# Patient Record
Sex: MALE | Race: WHITE | NOT HISPANIC OR LATINO | Employment: OTHER | URBAN - METROPOLITAN AREA
[De-identification: names, ages, dates, MRNs, and addresses within clinical notes are randomized per-mention and may not be internally consistent; named-entity substitution may affect disease eponyms.]

---

## 2019-02-03 PROBLEM — J18.9 PNEUMONIA DUE TO INFECTIOUS ORGANISM: Status: ACTIVE | Noted: 2019-02-03

## 2019-02-03 PROBLEM — J10.1 INFLUENZA A: Status: ACTIVE | Noted: 2019-02-03

## 2019-02-03 PROBLEM — I42.0 DILATED CARDIOMYOPATHY (HCC): Status: ACTIVE | Noted: 2019-02-03

## 2019-02-03 PROBLEM — I48.91 ATRIAL FIBRILLATION WITH RVR (HCC): Status: ACTIVE | Noted: 2019-02-03

## 2019-11-07 ENCOUNTER — APPOINTMENT (OUTPATIENT)
Dept: RADIOLOGY | Facility: MEDICAL CENTER | Age: 74
DRG: 389 | End: 2019-11-07
Attending: EMERGENCY MEDICINE
Payer: MEDICARE

## 2019-11-07 ENCOUNTER — HOSPITAL ENCOUNTER (INPATIENT)
Facility: MEDICAL CENTER | Age: 74
LOS: 2 days | DRG: 389 | End: 2019-11-09
Attending: EMERGENCY MEDICINE | Admitting: HOSPITALIST
Payer: MEDICARE

## 2019-11-07 ENCOUNTER — HOSPITAL ENCOUNTER (OUTPATIENT)
Dept: RADIOLOGY | Facility: MEDICAL CENTER | Age: 74
End: 2019-11-07

## 2019-11-07 PROBLEM — I48.91 ATRIAL FIBRILLATION (HCC): Status: ACTIVE | Noted: 2019-11-07

## 2019-11-07 PROBLEM — Q79.0 MORGAGNI HERNIA: Status: ACTIVE | Noted: 2019-11-07

## 2019-11-07 PROBLEM — R79.9 ELEVATED BUN: Status: ACTIVE | Noted: 2019-11-07

## 2019-11-07 PROBLEM — K56.609 SMALL BOWEL OBSTRUCTION (HCC): Status: ACTIVE | Noted: 2019-11-07

## 2019-11-07 PROBLEM — D69.6 THROMBOCYTOPENIA (HCC): Status: ACTIVE | Noted: 2019-11-07

## 2019-11-07 PROBLEM — E87.1 HYPONATREMIA: Status: ACTIVE | Noted: 2019-11-07

## 2019-11-07 PROBLEM — D64.9 ANEMIA: Status: ACTIVE | Noted: 2019-11-07

## 2019-11-07 LAB
ALBUMIN SERPL BCP-MCNC: 4.2 G/DL (ref 3.2–4.9)
ALBUMIN/GLOB SERPL: 1.4 G/DL
ALP SERPL-CCNC: 129 U/L (ref 30–99)
ALT SERPL-CCNC: 18 U/L (ref 2–50)
ANION GAP SERPL CALC-SCNC: 9 MMOL/L (ref 0–11.9)
APTT PPP: 26.1 SEC (ref 24.7–36)
AST SERPL-CCNC: 22 U/L (ref 12–45)
BASOPHILS # BLD AUTO: 0.2 % (ref 0–1.8)
BASOPHILS # BLD: 0.02 K/UL (ref 0–0.12)
BILIRUB SERPL-MCNC: 1.3 MG/DL (ref 0.1–1.5)
BUN SERPL-MCNC: 39 MG/DL (ref 8–22)
CALCIUM SERPL-MCNC: 9.7 MG/DL (ref 8.5–10.5)
CHLORIDE SERPL-SCNC: 98 MMOL/L (ref 96–112)
CO2 SERPL-SCNC: 27 MMOL/L (ref 20–33)
CREAT SERPL-MCNC: 1.17 MG/DL (ref 0.5–1.4)
EOSINOPHIL # BLD AUTO: 0.01 K/UL (ref 0–0.51)
EOSINOPHIL NFR BLD: 0.1 % (ref 0–6.9)
ERYTHROCYTE [DISTWIDTH] IN BLOOD BY AUTOMATED COUNT: 49 FL (ref 35.9–50)
GLOBULIN SER CALC-MCNC: 3 G/DL (ref 1.9–3.5)
GLUCOSE SERPL-MCNC: 120 MG/DL (ref 65–99)
HCT VFR BLD AUTO: 40.5 % (ref 42–52)
HGB BLD-MCNC: 13.4 G/DL (ref 14–18)
IMM GRANULOCYTES # BLD AUTO: 0.02 K/UL (ref 0–0.11)
IMM GRANULOCYTES NFR BLD AUTO: 0.2 % (ref 0–0.9)
INR PPP: 1.09 (ref 0.87–1.13)
LACTATE BLD-SCNC: 1.6 MMOL/L (ref 0.5–2)
LYMPHOCYTES # BLD AUTO: 0.41 K/UL (ref 1–4.8)
LYMPHOCYTES NFR BLD: 4.6 % (ref 22–41)
MCH RBC QN AUTO: 31.9 PG (ref 27–33)
MCHC RBC AUTO-ENTMCNC: 33.1 G/DL (ref 33.7–35.3)
MCV RBC AUTO: 96.4 FL (ref 81.4–97.8)
MONOCYTES # BLD AUTO: 0.4 K/UL (ref 0–0.85)
MONOCYTES NFR BLD AUTO: 4.5 % (ref 0–13.4)
NEUTROPHILS # BLD AUTO: 8.05 K/UL (ref 1.82–7.42)
NEUTROPHILS NFR BLD: 90.4 % (ref 44–72)
NRBC # BLD AUTO: 0 K/UL
NRBC BLD-RTO: 0 /100 WBC
PLATELET # BLD AUTO: 157 K/UL (ref 164–446)
PMV BLD AUTO: 10.1 FL (ref 9–12.9)
POTASSIUM SERPL-SCNC: 4.2 MMOL/L (ref 3.6–5.5)
PROT SERPL-MCNC: 7.2 G/DL (ref 6–8.2)
PROTHROMBIN TIME: 14.4 SEC (ref 12–14.6)
RBC # BLD AUTO: 4.2 M/UL (ref 4.7–6.1)
SODIUM SERPL-SCNC: 134 MMOL/L (ref 135–145)
WBC # BLD AUTO: 8.9 K/UL (ref 4.8–10.8)

## 2019-11-07 PROCEDURE — 85730 THROMBOPLASTIN TIME PARTIAL: CPT

## 2019-11-07 PROCEDURE — 85025 COMPLETE CBC W/AUTO DIFF WBC: CPT | Mod: 91

## 2019-11-07 PROCEDURE — 99285 EMERGENCY DEPT VISIT HI MDM: CPT

## 2019-11-07 PROCEDURE — 80053 COMPREHEN METABOLIC PANEL: CPT | Mod: 91

## 2019-11-07 PROCEDURE — 96374 THER/PROPH/DIAG INJ IV PUSH: CPT

## 2019-11-07 PROCEDURE — 700105 HCHG RX REV CODE 258: Performed by: EMERGENCY MEDICINE

## 2019-11-07 PROCEDURE — 770006 HCHG ROOM/CARE - MED/SURG/GYN SEMI*

## 2019-11-07 PROCEDURE — 99223 1ST HOSP IP/OBS HIGH 75: CPT | Performed by: HOSPITALIST

## 2019-11-07 PROCEDURE — 700111 HCHG RX REV CODE 636 W/ 250 OVERRIDE (IP): Performed by: EMERGENCY MEDICINE

## 2019-11-07 PROCEDURE — 85610 PROTHROMBIN TIME: CPT

## 2019-11-07 PROCEDURE — 83605 ASSAY OF LACTIC ACID: CPT | Mod: 91

## 2019-11-07 RX ORDER — HYDRALAZINE HYDROCHLORIDE 20 MG/ML
10 INJECTION INTRAMUSCULAR; INTRAVENOUS EVERY 6 HOURS PRN
Status: DISCONTINUED | OUTPATIENT
Start: 2019-11-07 | End: 2019-11-09 | Stop reason: HOSPADM

## 2019-11-07 RX ORDER — SPIRONOLACTONE 50 MG/1
25 TABLET, FILM COATED ORAL 2 TIMES DAILY
Status: DISCONTINUED | OUTPATIENT
Start: 2019-11-07 | End: 2019-11-09 | Stop reason: HOSPADM

## 2019-11-07 RX ORDER — ONDANSETRON 4 MG/1
4 TABLET, ORALLY DISINTEGRATING ORAL EVERY 4 HOURS PRN
Status: DISCONTINUED | OUTPATIENT
Start: 2019-11-07 | End: 2019-11-09 | Stop reason: HOSPADM

## 2019-11-07 RX ORDER — SPIRONOLACTONE 25 MG/1
25 TABLET ORAL 2 TIMES DAILY
Status: ON HOLD | COMMUNITY
End: 2022-04-12 | Stop reason: SDUPTHER

## 2019-11-07 RX ORDER — OXYCODONE HYDROCHLORIDE 10 MG/1
10 TABLET ORAL
Status: DISCONTINUED | OUTPATIENT
Start: 2019-11-07 | End: 2019-11-09 | Stop reason: HOSPADM

## 2019-11-07 RX ORDER — FUROSEMIDE 20 MG/1
20 TABLET ORAL DAILY
Status: ON HOLD | COMMUNITY
End: 2021-11-16

## 2019-11-07 RX ORDER — ONDANSETRON 2 MG/ML
4 INJECTION INTRAMUSCULAR; INTRAVENOUS EVERY 4 HOURS PRN
Status: DISCONTINUED | OUTPATIENT
Start: 2019-11-07 | End: 2019-11-09 | Stop reason: HOSPADM

## 2019-11-07 RX ORDER — POLYETHYLENE GLYCOL 3350 17 G/17G
1 POWDER, FOR SOLUTION ORAL
Status: DISCONTINUED | OUTPATIENT
Start: 2019-11-07 | End: 2019-11-09 | Stop reason: HOSPADM

## 2019-11-07 RX ORDER — CARVEDILOL 6.25 MG/1
3.12 TABLET ORAL DAILY
Status: DISCONTINUED | OUTPATIENT
Start: 2019-11-08 | End: 2019-11-09 | Stop reason: HOSPADM

## 2019-11-07 RX ORDER — SODIUM CHLORIDE 9 MG/ML
INJECTION, SOLUTION INTRAVENOUS CONTINUOUS
Status: DISCONTINUED | OUTPATIENT
Start: 2019-11-07 | End: 2019-11-09 | Stop reason: HOSPADM

## 2019-11-07 RX ORDER — GABAPENTIN 300 MG/1
600 CAPSULE ORAL 3 TIMES DAILY
Status: DISCONTINUED | OUTPATIENT
Start: 2019-11-07 | End: 2019-11-09 | Stop reason: HOSPADM

## 2019-11-07 RX ORDER — PANTOPRAZOLE SODIUM 40 MG/1
40 TABLET, DELAYED RELEASE ORAL 2 TIMES DAILY
Status: DISCONTINUED | OUTPATIENT
Start: 2019-11-07 | End: 2019-11-07

## 2019-11-07 RX ORDER — HYDROMORPHONE HYDROCHLORIDE 1 MG/ML
0.5 INJECTION, SOLUTION INTRAMUSCULAR; INTRAVENOUS; SUBCUTANEOUS
Status: DISCONTINUED | OUTPATIENT
Start: 2019-11-07 | End: 2019-11-09 | Stop reason: HOSPADM

## 2019-11-07 RX ORDER — AMOXICILLIN 250 MG
2 CAPSULE ORAL 2 TIMES DAILY
Status: DISCONTINUED | OUTPATIENT
Start: 2019-11-07 | End: 2019-11-09 | Stop reason: HOSPADM

## 2019-11-07 RX ORDER — BISACODYL 10 MG
10 SUPPOSITORY, RECTAL RECTAL
Status: DISCONTINUED | OUTPATIENT
Start: 2019-11-07 | End: 2019-11-09 | Stop reason: HOSPADM

## 2019-11-07 RX ORDER — PANTOPRAZOLE SODIUM 40 MG/1
40 TABLET, DELAYED RELEASE ORAL 2 TIMES DAILY
COMMUNITY

## 2019-11-07 RX ORDER — OXYCODONE HYDROCHLORIDE 5 MG/1
5 TABLET ORAL
Status: DISCONTINUED | OUTPATIENT
Start: 2019-11-07 | End: 2019-11-09 | Stop reason: HOSPADM

## 2019-11-07 RX ORDER — SODIUM CHLORIDE, SODIUM LACTATE, POTASSIUM CHLORIDE, CALCIUM CHLORIDE 600; 310; 30; 20 MG/100ML; MG/100ML; MG/100ML; MG/100ML
1000 INJECTION, SOLUTION INTRAVENOUS ONCE
Status: COMPLETED | OUTPATIENT
Start: 2019-11-07 | End: 2019-11-07

## 2019-11-07 RX ORDER — DOCUSATE SODIUM 100 MG/1
100 CAPSULE, LIQUID FILLED ORAL 3 TIMES DAILY
Status: ON HOLD | COMMUNITY
End: 2023-03-19

## 2019-11-07 RX ORDER — CARVEDILOL 3.12 MG/1
3.12 TABLET ORAL 2 TIMES DAILY
COMMUNITY

## 2019-11-07 RX ORDER — GABAPENTIN 300 MG/1
600 CAPSULE ORAL 3 TIMES DAILY
COMMUNITY

## 2019-11-07 RX ORDER — LOSARTAN POTASSIUM 50 MG/1
25 TABLET ORAL 2 TIMES DAILY
Status: DISCONTINUED | OUTPATIENT
Start: 2019-11-07 | End: 2019-11-09 | Stop reason: HOSPADM

## 2019-11-07 RX ORDER — OMEPRAZOLE 20 MG/1
20 CAPSULE, DELAYED RELEASE ORAL 2 TIMES DAILY
Status: DISCONTINUED | OUTPATIENT
Start: 2019-11-07 | End: 2019-11-09 | Stop reason: HOSPADM

## 2019-11-07 RX ORDER — LABETALOL HYDROCHLORIDE 5 MG/ML
10 INJECTION, SOLUTION INTRAVENOUS EVERY 6 HOURS PRN
Status: DISCONTINUED | OUTPATIENT
Start: 2019-11-07 | End: 2019-11-09 | Stop reason: HOSPADM

## 2019-11-07 RX ORDER — DIGOXIN 250 MCG
125 TABLET ORAL DAILY
Status: DISCONTINUED | OUTPATIENT
Start: 2019-11-07 | End: 2019-11-08

## 2019-11-07 RX ORDER — POLYETHYLENE GLYCOL 3350 17 G/17G
17 POWDER, FOR SOLUTION ORAL
COMMUNITY
End: 2022-07-25

## 2019-11-07 RX ORDER — HYDROMORPHONE HYDROCHLORIDE 1 MG/ML
1 INJECTION, SOLUTION INTRAMUSCULAR; INTRAVENOUS; SUBCUTANEOUS ONCE
Status: COMPLETED | OUTPATIENT
Start: 2019-11-07 | End: 2019-11-07

## 2019-11-07 RX ORDER — PRAVASTATIN SODIUM 40 MG
40 TABLET ORAL NIGHTLY
COMMUNITY

## 2019-11-07 RX ORDER — LOSARTAN POTASSIUM 25 MG/1
25 TABLET ORAL 2 TIMES DAILY
Status: ON HOLD | COMMUNITY
End: 2021-11-16

## 2019-11-07 RX ORDER — PRAVASTATIN SODIUM 20 MG
40 TABLET ORAL NIGHTLY
Status: DISCONTINUED | OUTPATIENT
Start: 2019-11-07 | End: 2019-11-09 | Stop reason: HOSPADM

## 2019-11-07 RX ORDER — FUROSEMIDE 20 MG/1
20 TABLET ORAL DAILY
Status: DISCONTINUED | OUTPATIENT
Start: 2019-11-08 | End: 2019-11-09 | Stop reason: HOSPADM

## 2019-11-07 RX ADMIN — SODIUM CHLORIDE, POTASSIUM CHLORIDE, SODIUM LACTATE AND CALCIUM CHLORIDE 1000 ML: 600; 310; 30; 20 INJECTION, SOLUTION INTRAVENOUS at 18:27

## 2019-11-07 RX ADMIN — HYDROMORPHONE HYDROCHLORIDE 1 MG: 1 INJECTION, SOLUTION INTRAMUSCULAR; INTRAVENOUS; SUBCUTANEOUS at 18:44

## 2019-11-07 ASSESSMENT — LIFESTYLE VARIABLES
AVERAGE NUMBER OF DAYS PER WEEK YOU HAVE A DRINK CONTAINING ALCOHOL: 7
HOW MANY TIMES IN THE PAST YEAR HAVE YOU HAD 5 OR MORE DRINKS IN A DAY: 0
CONSUMPTION TOTAL: NEGATIVE
HAVE PEOPLE ANNOYED YOU BY CRITICIZING YOUR DRINKING: NO
SUBSTANCE_ABUSE: 0
HAVE YOU EVER FELT YOU SHOULD CUT DOWN ON YOUR DRINKING: NO
ON A TYPICAL DAY WHEN YOU DRINK ALCOHOL HOW MANY DRINKS DO YOU HAVE: 2
EVER_SMOKED: NEVER
TOTAL SCORE: 0
EVER FELT BAD OR GUILTY ABOUT YOUR DRINKING: NO
TOTAL SCORE: 0
EVER HAD A DRINK FIRST THING IN THE MORNING TO STEADY YOUR NERVES TO GET RID OF A HANGOVER: NO
DOES PATIENT WANT TO STOP DRINKING: NO
TOTAL SCORE: 0
ALCOHOL_USE: YES

## 2019-11-07 ASSESSMENT — ENCOUNTER SYMPTOMS
ABDOMINAL PAIN: 1
PALPITATIONS: 0
BRUISES/BLEEDS EASILY: 0
FLANK PAIN: 0
EYE DISCHARGE: 0
FEVER: 0
CHILLS: 0
WEAKNESS: 0
FOCAL WEAKNESS: 0
HEARTBURN: 0
DOUBLE VISION: 0
COUGH: 0
HEMOPTYSIS: 0
DEPRESSION: 0
SPEECH CHANGE: 0
DIZZINESS: 0
BLURRED VISION: 0
SENSORY CHANGE: 0
VOMITING: 0
NAUSEA: 1
HALLUCINATIONS: 0
MYALGIAS: 0
SHORTNESS OF BREATH: 0

## 2019-11-07 ASSESSMENT — COPD QUESTIONNAIRES
HAVE YOU SMOKED AT LEAST 100 CIGARETTES IN YOUR ENTIRE LIFE: NO/DON'T KNOW
IN THE PAST 12 MONTHS DO YOU DO LESS THAN YOU USED TO BECAUSE OF YOUR BREATHING PROBLEMS: DISAGREE/UNSURE
DURING THE PAST 4 WEEKS HOW MUCH DID YOU FEEL SHORT OF BREATH: NONE/LITTLE OF THE TIME
COPD SCREENING SCORE: 2
DO YOU EVER COUGH UP ANY MUCUS OR PHLEGM?: NO/ONLY WITH OCCASIONAL COLDS OR INFECTIONS

## 2019-11-07 ASSESSMENT — COGNITIVE AND FUNCTIONAL STATUS - GENERAL
SUGGESTED CMS G CODE MODIFIER MOBILITY: CH
SUGGESTED CMS G CODE MODIFIER MOBILITY: CH
SUGGESTED CMS G CODE MODIFIER DAILY ACTIVITY: CH
MOBILITY SCORE: 24
MOBILITY SCORE: 24
DAILY ACTIVITIY SCORE: 24

## 2019-11-07 ASSESSMENT — PATIENT HEALTH QUESTIONNAIRE - PHQ9
SUM OF ALL RESPONSES TO PHQ9 QUESTIONS 1 AND 2: 0
1. LITTLE INTEREST OR PLEASURE IN DOING THINGS: NOT AT ALL
2. FEELING DOWN, DEPRESSED, IRRITABLE, OR HOPELESS: NOT AT ALL

## 2019-11-08 LAB
ALBUMIN SERPL BCP-MCNC: 3.8 G/DL (ref 3.2–4.9)
ALBUMIN/GLOB SERPL: 1.6 G/DL
ALP SERPL-CCNC: 108 U/L (ref 30–99)
ALT SERPL-CCNC: 16 U/L (ref 2–50)
ANION GAP SERPL CALC-SCNC: 6 MMOL/L (ref 0–11.9)
AST SERPL-CCNC: 18 U/L (ref 12–45)
BASOPHILS # BLD AUTO: 0.2 % (ref 0–1.8)
BASOPHILS # BLD: 0.01 K/UL (ref 0–0.12)
BILIRUB SERPL-MCNC: 1.2 MG/DL (ref 0.1–1.5)
BUN SERPL-MCNC: 33 MG/DL (ref 8–22)
CALCIUM SERPL-MCNC: 9.6 MG/DL (ref 8.5–10.5)
CHLORIDE SERPL-SCNC: 100 MMOL/L (ref 96–112)
CO2 SERPL-SCNC: 30 MMOL/L (ref 20–33)
CREAT SERPL-MCNC: 1.07 MG/DL (ref 0.5–1.4)
EOSINOPHIL # BLD AUTO: 0.04 K/UL (ref 0–0.51)
EOSINOPHIL NFR BLD: 0.8 % (ref 0–6.9)
ERYTHROCYTE [DISTWIDTH] IN BLOOD BY AUTOMATED COUNT: 49.6 FL (ref 35.9–50)
GLOBULIN SER CALC-MCNC: 2.4 G/DL (ref 1.9–3.5)
GLUCOSE SERPL-MCNC: 93 MG/DL (ref 65–99)
HCT VFR BLD AUTO: 37.5 % (ref 42–52)
HGB BLD-MCNC: 12 G/DL (ref 14–18)
IMM GRANULOCYTES # BLD AUTO: 0.01 K/UL (ref 0–0.11)
IMM GRANULOCYTES NFR BLD AUTO: 0.2 % (ref 0–0.9)
LYMPHOCYTES # BLD AUTO: 0.67 K/UL (ref 1–4.8)
LYMPHOCYTES NFR BLD: 12.7 % (ref 22–41)
MCH RBC QN AUTO: 30.9 PG (ref 27–33)
MCHC RBC AUTO-ENTMCNC: 32 G/DL (ref 33.7–35.3)
MCV RBC AUTO: 96.6 FL (ref 81.4–97.8)
MONOCYTES # BLD AUTO: 0.46 K/UL (ref 0–0.85)
MONOCYTES NFR BLD AUTO: 8.7 % (ref 0–13.4)
NEUTROPHILS # BLD AUTO: 4.1 K/UL (ref 1.82–7.42)
NEUTROPHILS NFR BLD: 77.4 % (ref 44–72)
NRBC # BLD AUTO: 0 K/UL
NRBC BLD-RTO: 0 /100 WBC
PLATELET # BLD AUTO: 161 K/UL (ref 164–446)
PMV BLD AUTO: 9.9 FL (ref 9–12.9)
POTASSIUM SERPL-SCNC: 4 MMOL/L (ref 3.6–5.5)
PROT SERPL-MCNC: 6.2 G/DL (ref 6–8.2)
RBC # BLD AUTO: 3.88 M/UL (ref 4.7–6.1)
SODIUM SERPL-SCNC: 136 MMOL/L (ref 135–145)
WBC # BLD AUTO: 5.3 K/UL (ref 4.8–10.8)

## 2019-11-08 PROCEDURE — 99232 SBSQ HOSP IP/OBS MODERATE 35: CPT | Performed by: HOSPITALIST

## 2019-11-08 PROCEDURE — 700111 HCHG RX REV CODE 636 W/ 250 OVERRIDE (IP): Performed by: NURSE PRACTITIONER

## 2019-11-08 PROCEDURE — 36415 COLL VENOUS BLD VENIPUNCTURE: CPT

## 2019-11-08 PROCEDURE — 85025 COMPLETE CBC W/AUTO DIFF WBC: CPT

## 2019-11-08 PROCEDURE — 770006 HCHG ROOM/CARE - MED/SURG/GYN SEMI*

## 2019-11-08 PROCEDURE — 80053 COMPREHEN METABOLIC PANEL: CPT

## 2019-11-08 PROCEDURE — 700105 HCHG RX REV CODE 258: Performed by: HOSPITALIST

## 2019-11-08 RX ORDER — HEPARIN SODIUM 5000 [USP'U]/ML
5000 INJECTION, SOLUTION INTRAVENOUS; SUBCUTANEOUS EVERY 8 HOURS
Status: DISCONTINUED | OUTPATIENT
Start: 2019-11-08 | End: 2019-11-09 | Stop reason: HOSPADM

## 2019-11-08 RX ORDER — DIGOXIN 0.25 MG/ML
125 INJECTION INTRAMUSCULAR; INTRAVENOUS DAILY
Status: DISCONTINUED | OUTPATIENT
Start: 2019-11-09 | End: 2019-11-09 | Stop reason: HOSPADM

## 2019-11-08 RX ADMIN — SODIUM CHLORIDE: 9 INJECTION, SOLUTION INTRAVENOUS at 05:30

## 2019-11-08 RX ADMIN — HEPARIN SODIUM 5000 UNITS: 5000 INJECTION, SOLUTION INTRAVENOUS; SUBCUTANEOUS at 22:33

## 2019-11-08 RX ADMIN — HEPARIN SODIUM 5000 UNITS: 5000 INJECTION, SOLUTION INTRAVENOUS; SUBCUTANEOUS at 14:33

## 2019-11-08 RX ADMIN — DIGOXIN 125 MCG: 100 INJECTION, SOLUTION INTRAMUSCULAR; INTRAVENOUS at 17:45

## 2019-11-08 ASSESSMENT — ENCOUNTER SYMPTOMS
WHEEZING: 0
PALPITATIONS: 0
BLOOD IN STOOL: 0
DIARRHEA: 0
WEIGHT LOSS: 0
ORTHOPNEA: 0
VOMITING: 0
NAUSEA: 0
CHILLS: 0
FEVER: 0
SHORTNESS OF BREATH: 0
ABDOMINAL PAIN: 0
COUGH: 0

## 2019-11-08 NOTE — PROGRESS NOTES
2 RN Skin Check    NGT to R nare to low intermittent suction  Lump to RUQ that patient states is implanted pain pump  IV to R AC infusing NS @ 100  BLE dusky, red, tight, and warm with 1-2+ edema in ankles and feet.  Heels intact, feet dry and calloused with cracking in callous on ball of right foot.  Face flushed.  No other wounds noted.

## 2019-11-08 NOTE — H&P
Hospital Medicine History & Physical Note    Date of Service  11/7/2019    Primary Care Physician  Zi Valdez M.D.    Consultants  Surgery     Code Status  full    Chief Complaint  abd pain     History of Presenting Illness  74 y.o. male who presented 11/7/2019 with past medical history of atrial fibrillation not on anticoagulation, CHF, hyperlipidemia, hypertension who presents with abdominal pain.  This patient had upper abdominal pain since this morning.  Felt like his abdominal pain was tight and squeezing.  No alleviating or exacerbating factors to his symptoms.  He visited outside hospital [facility].  He was diagnosed at that time with a small bowel obstruction that extends into the abdomen hernia.  He will be admitted to the hospital with surgical consultation.    Review of Systems  Review of Systems   Constitutional: Positive for malaise/fatigue. Negative for chills and fever.   HENT: Negative for congestion, hearing loss and tinnitus.    Eyes: Negative for blurred vision, double vision and discharge.   Respiratory: Negative for cough, hemoptysis and shortness of breath.    Cardiovascular: Negative for chest pain, palpitations and leg swelling.   Gastrointestinal: Positive for abdominal pain and nausea. Negative for heartburn and vomiting.   Genitourinary: Negative for dysuria and flank pain.   Musculoskeletal: Negative for joint pain and myalgias.   Skin: Negative for rash.   Neurological: Negative for dizziness, sensory change, speech change, focal weakness and weakness.   Endo/Heme/Allergies: Negative for environmental allergies. Does not bruise/bleed easily.   Psychiatric/Behavioral: Negative for depression, hallucinations and substance abuse.       Past Medical History   has a past medical history of Arthritis, Atrial fibrillation (HCC), Blood transfusion without reported diagnosis, Cataract, Congestive heart failure with left ventricular systolic dysfunction (HCC), Hepatitis A,  History of blood clots, Hyperlipidemia, Hypertension, and OA (osteoarthritis) of neck. He also has no past medical history of Pituitary disease (HCC) or Renal disorder.    Surgical History   has a past surgical history that includes knee revision total; carpal tunnel release; pr spinal fusion,ant,ea adnl level; zzz ivc filter-cath lab; trigger finger release; gastroscopy (N/A, 2/22/2019); colonoscopy (N/A, 2/22/2019); other; other; and gastroscopy (N/A, 5/14/2019).     Family History  family history includes Heart Attack in his father; Heart Disease in his father; Lung Disease in his father.     Social History   reports that he has quit smoking. He has never used smokeless tobacco. He reports that he does not drink alcohol or use drugs.    Allergies  No Known Allergies    Medications  Prior to Admission Medications   Prescriptions Last Dose Informant Patient Reported? Taking?   Calcium Carb-Cholecalciferol (CALCIUM-VITAMIN D) 600-400 MG-UNIT Tab 11/7/2019 at 0730 Patient Yes Yes   Sig: Take 1 Tab by mouth every day.   Calcium Citrate-Vitamin D (CALCIUM + D PO) 11/7/2019 at 0730 Patient Yes No   Sig: Take 1 Cap by mouth every day.   Ferrous Sulfate (FEOSOL PO) 11/6/2019 at afternoon Patient Yes Yes   Sig: Take 1 Tab by mouth every day.   Pain Pump (PATIENT SUPPLIED) XX PARVIZ continuous at continuous Patient Yes Yes   Sig: by Injection route Continuous. Patient's Pain Pump (placed and maintained as an outpatient)  Medications/concentrations:   HYDROMORPHONE 2.991 mg/day  BUPIVICAINE 4.985 m/day  Route: k  Date of Placement: Sept 2015  Last changed: unk  Continuous infusion rates (Drug/Rate):  unk  Patient activation dose: unk  Patient activation lockout interval: 3  Maximum activations per day: 3   Dr. Rankin   Probiotic Product (PROBIOTIC PO) 11/6/2019 at afternoon Patient Yes Yes   Sig: Take 1 Cap by mouth every day.   carvedilol (COREG) 3.125 MG Tab 11/7/2019 at 0730 Patient Yes Yes   Sig: Take 3.125 mg by mouth  every day.   docusate sodium (COLACE) 100 MG Cap 11/7/2019 at 0730 Patient Yes Yes   Sig: Take 100 mg by mouth 3 times a day.   doxycycline (VIBRAMYCIN) 100 MG Tab 11/7/2019 at 0730 Patient Yes No   Sig: Take 100 mg by mouth 2 times a day.   furosemide (LASIX) 20 MG Tab 11/7/2019 at 0730 Patient Yes Yes   Sig: Take 20 mg by mouth every day.   gabapentin (NEURONTIN) 300 MG Cap 11/7/2019 at 0730 Patient Yes Yes   Sig: Take 600 mg by mouth 3 times a day.   losartan (COZAAR) 25 MG Tab 11/7/2019 at 0730 Patient Yes Yes   Sig: Take 25 mg by mouth 2 Times a Day.   pantoprazole (PROTONIX) 40 MG Tablet Delayed Response 11/7/2019 at 0730 Patient Yes Yes   Sig: Take 40 mg by mouth 2 Times a Day.   polyethylene glycol/lytes (MIRALAX) Pack 11/7/2019 at 0730 Patient Yes Yes   Sig: Take 17 g by mouth every day.   pravastatin (PRAVACHOL) 40 MG tablet 11/6/2019 at pm Patient Yes Yes   Sig: Take 40 mg by mouth every evening.   spironolactone (ALDACTONE) 25 MG Tab 11/7/2019 at 0730 Patient Yes Yes   Sig: Take 25 mg by mouth 2 Times a Day.      Facility-Administered Medications: None       Physical Exam  Temp:  [36.9 °C (98.4 °F)] 36.9 °C (98.4 °F)  Pulse:  [65-94] 82  Resp:  [16-40] 16  BP: (117-138)/(73-96) 138/94  SpO2:  [78 %-100 %] 98 %    Physical Exam  Vitals signs reviewed.   Constitutional:       General: He is in acute distress.      Appearance: He is not ill-appearing.   HENT:      Head: Normocephalic and atraumatic.      Nose: No congestion.      Mouth/Throat:      Mouth: Mucous membranes are moist.   Eyes:      Extraocular Movements: Extraocular movements intact.      Pupils: Pupils are equal, round, and reactive to light.   Neck:      Musculoskeletal: Neck supple.   Cardiovascular:      Rate and Rhythm: Normal rate and regular rhythm.      Pulses: Normal pulses.      Heart sounds: Normal heart sounds.   Pulmonary:      Effort: Pulmonary effort is normal. No respiratory distress.      Breath sounds: Normal breath sounds.  No wheezing.   Abdominal:      General: Bowel sounds are normal. There is no distension.      Palpations: Abdomen is soft.      Tenderness: There is tenderness.      Comments: Epigastric ttp    Musculoskeletal:         General: No swelling.   Skin:     General: Skin is warm and dry.      Capillary Refill: Capillary refill takes less than 2 seconds.   Neurological:      General: No focal deficit present.      Mental Status: He is alert and oriented to person, place, and time. Mental status is at baseline.   Psychiatric:         Mood and Affect: Mood normal.         Behavior: Behavior normal.         Thought Content: Thought content normal.         Judgment: Judgment normal.         Laboratory:  Recent Labs     11/07/19  1315 11/07/19  1830   WBC 8.0 8.9   RBC 4.24* 4.20*   HEMOGLOBIN 13.2* 13.4*   HEMATOCRIT 39.8* 40.5*   MCV 93.9 96.4   MCH 31.1* 31.9   MCHC 33.2 33.1*   RDW 13.8 49.0   PLATELETCT 181 157*   MPV 9.7 10.1     Recent Labs     11/07/19  1326   SODIUM 138   POTASSIUM 3.8   CHLORIDE 99   CO2 27   GLUCOSE 125*   BUN 41*   CREATININE 1.4*   CALCIUM 9.5     Recent Labs     11/07/19  1326   ALTSGPT 27   ASTSGOT 25   ALKPHOSPHAT 158*   TBILIRUBIN 1.1*   LIPASE 82   GLUCOSE 125*     Recent Labs     11/07/19  1830   APTT 26.1   INR 1.09     No results for input(s): NTPROBNP in the last 72 hours.      No results for input(s): TROPONINT in the last 72 hours.    Urinalysis:    No results found     Imaging:  DX-ABDOMEN FOR TUBE PLACEMENT   Final Result         1.  Air-filled distended loops of bowel are seen with reactive mucosal pattern, appearance suggests ileus or enteritis. Recommend radiographic followup to resolution to exclude progression to obstruction.   2.  Nasogastric tube tip terminates overlying the expected location of the gastric body.   3.  Right Calvo omar is fractured in 2 locations.      DX-ABDOMEN FOR TUBE PLACEMENT   Final Result         1.  Air-filled distended loops of bowel are seen with  reactive mucosal pattern, appearance suggests ileus or enteritis. Recommend radiographic followup to resolution to exclude progression to obstruction.   2.  Nasogastric tube tip terminates overlying the expected location of the gastroesophageal junction, recommend advancement.   3.  Bilateral lower lobe infiltrates.   4.  Right Calvo omar appears fractured in 2 locations.      OUTSIDE IMAGES-CT ABDOMEN /PELVIS   Final Result            Assessment/Plan:  I anticipate this patient will require at least two midnights for appropriate medical management, necessitating inpatient admission.    * Small bowel obstruction (HCC)  Assessment & Plan  CT from OSH   1.  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 Emergent general surgical consultation recommended    Dr. Correa consulted at [facility], patient wishes at this time to try conservative managmenet   IVF, anti emetics, pain control for now   NG tube to LIS   Strict NPO until cleared by surgery     Thrombocytopenia (HCC)  Assessment & Plan  Mild, cont to montior     Anemia  Assessment & Plan  Mild, no evidence of bleeding   Cont to montior     Hyponatremia  Assessment & Plan  Hypovolemic, cont with IVF and montior    Elevated BUN  Assessment & Plan  IVF and repeat labs in am     Morgagni hernia  Assessment & Plan  Noted on CT scan   Surgery has been consulted for evaluation     Atrial fibrillation (HCC)  Assessment & Plan  Not on anticoagualtion   Resume home digoxin when appropriate  Cont to monitor     Dilated cardiomyopathy (HCC)- (present on admission)  Assessment & Plan  Avoid aggressive IVF at this time to prevent volume overload   Resume home arb, aldactone, lasix and bB when appropriate     Hyperlipidemia- (present on admission)  Assessment & Plan  Resume home statin therapy when appropriate    Essential hypertension- (present on  admission)  Assessment & Plan  Resume home anti hypertensive medications when appropriate   Strict NPO for now   Prn hydralazine and labetalol ordered      VTE prophylaxis: scd

## 2019-11-08 NOTE — ED PROVIDER NOTES
"ED Provider Note    Scribed for Ahmet Mercado D.O. by Penny Zhu. 11/7/2019  6:13 PM    Primary care provider: Zi Valdez M.D.  Means of arrival: EMS  History obtained from: Patient  History limited by: None    CHIEF COMPLAINT  Chief Complaint   Patient presents with   • Sent by MD   • Abdominal Pain       HPI  Jimmie Zamudio is a 74 y.o. male, with a history of chronic back pain, who presents to the Emergency Department for evaluation of acute, constant, non-radiating bilateral upper abdominal pain onset this morning. He describes his abdominal pain as tight and squeezing, he notes \"it feels like someone is squeezing my guts\". No exacerbating or alleviating factors were reported. The patient does not report taking any over the counter medications for pain control. He notes that his abdominal pain was not alleviated since the onset of his symptoms, which prompted [place] [facility] facility performed imaging which showed that the patient has a small bowel obstruction that extends into the abdomen hernia which was the cause of his abdominal pain. He was transferred to this facility, via EMS, for further care. En route, the patient was medicated with Dilaudid 1 mg. The patient states that he is also has back pain and bilateral pedal edema, which are both his baseline. Denies recent symptoms of fevers, difficulty breathing, nausea or vomiting. He notes that he has a pain pump with Morphine and bupivacaine for chronic back pain. He notes that his last oral intake was at 7 AM today. Denies past medical history of hernia. Denies taking blood thinners. Reported past medical history includes atrial fibrillation.        REVIEW OF SYSTEMS  Pertinent positives include abdominal pain, back pain, bilateral pedal edema. Pertinent negatives include no  fevers, difficulty breathing, nausea or vomiting..  All other systems reviewed and negative.    PAST MEDICAL " HISTORY  Past Medical History:   Diagnosis Date   • Arthritis    • Atrial fibrillation (HCC)    • Blood transfusion without reported diagnosis    • Cataract    • Congestive heart failure with left ventricular systolic dysfunction (HCC)     EF 25%   • Hepatitis A    • History of blood clots    • Hyperlipidemia    • Hypertension    • OA (osteoarthritis) of neck        SURGICAL HISTORY  Past Surgical History:   Procedure Laterality Date   • GASTROSCOPY N/A 5/14/2019    Procedure: GASTROSCOPY;  Surgeon: Zi Fam M.D.;  Location: SURGERY [facility];  Service: General   • GASTROSCOPY N/A 2/22/2019    Procedure: GASTROSCOPY;  Surgeon: Zi Fam M.D.;  Location: SURGERY [facility];  Service: General   • COLONOSCOPY N/A 2/22/2019    Procedure: COLONOSCOPY;  Surgeon: Zi Fam M.D.;  Location: SURGERY [facility];  Service: General   • CARPAL TUNNEL RELEASE     • KNEE REVISION TOTAL     • OTHER      Pain Pump   • OTHER      metal removed from back   • PB SPINAL FUSION,ANT,EA ADNL LEVEL      spinal fusion lower back   • TRIGGER FINGER RELEASE     • ZZZ IVC FILTER-CATH LAB      prior DVT        SOCIAL HISTORY  Social History     Tobacco Use   • Smoking status: Former Smoker   • Smokeless tobacco: Never Used   Substance Use Topics   • Alcohol use: No     Alcohol/week: 0.0 oz   • Drug use: No      Social History     Substance and Sexual Activity   Drug Use No       FAMILY HISTORY  Family History   Problem Relation Age of Onset   • Heart Disease Father    • Heart Attack Father    • Lung Disease Father        CURRENT MEDICATIONS  Current Outpatient Medications:   •  doxycycline (VIBRAMYCIN) 100 MG Tab, Take 100 mg by mouth 2 times a day., Disp: , Rfl:   •  DIGOXIN PO, Take 125 mg by mouth every day., Disp: , Rfl:   •  losartan (COZAAR) 25 MG Tab, Take 1 Tab by mouth every day., Disp: 30 Tab, Rfl: 1  •  carvedilol (COREG) 3.125 MG Tab, Take 1 Tab by mouth 2 times a day, with meals., Disp: 60 Tab,  "Rfl: 1  •  furosemide (LASIX) 20 MG Tab, Take 1 Tab by mouth every day., Disp: 30 Tab, Rfl: 0  •  spironolactone (ALDACTONE) 25 MG Tab, Take 1 Tab by mouth 2 times a day., Disp: 60 Tab, Rfl: 0  •  pantoprazole (PROTONIX) 40 MG Recon Soln, 40 mg by Intravenous route 2 Times a Day. (Patient taking differently: 40 mg 2 Times a Day.), Disp: 60 Each, Rfl: 1  •  polyethylene glycol 3350 (MIRALAX) Powder, take 17GM (DISSOLVED IN WATER) by mouth once daily, Disp: 527 g, Rfl: 1  •  pravastatin (PRAVACHOL) 40 MG tablet, TAKE 1 TABLET BY MOUTH AT  BEDTIME, Disp: 90 Tab, Rfl: 0  •  gabapentin (NEURONTIN) 300 MG Cap, TAKE 2 CAPSULES BY MOUTH 3  TIMES A DAY AS DIRECTED, Disp: 540 Cap, Rfl: 0  •  Calcium Citrate-Vitamin D (CALCIUM + D PO), Take 1 Cap by mouth every day., Disp: , Rfl:     ALLERGIES  No Known Allergies    PHYSICAL EXAM  VITAL SIGNS: Pulse 94   Temp 36.9 °C (98.4 °F) (Temporal)   Ht 2.007 m (6' 7\")   Wt 108.9 kg (240 lb)   SpO2 90%   BMI 27.04 kg/m²     Nursing notes and vitals reviewed.  Constitutional: Well developed, Well nourished, No acute distress, Non-toxic appearance.   Eyes: PERRLA, EOMI, Conjunctiva normal, No discharge.   Cardiovascular: Normal heart rate, Normal rhythm, No murmurs, No rubs, No gallops.   Thorax & Lungs: No respiratory distress, No rales, No rhonchi, No wheezing, No chest tenderness. See extremities for further details.   Abdomen: Bowel sounds normal, Soft, Right and left upper quadrant tenderness, pain stimulator in the right lower anterior abdominal wall no guarding, No rebound, No masses, No pulsatile masses.   Skin: Warm, Dry, No erythema, No rash.   Musculoskeletal: Intact distal pulses, No cyanosis, No clubbing. No major deformities noted, no CVA tenderness.  Extremities: Bilateral pedal edema  Neurologic: Alert & oriented x 3, Normal motor function, Normal sensory function, No focal deficits noted.  Psychiatric: Affect normal for clinical presentation.      DIAGNOSTIC " STUDIES/PROCEDURES    LABS  Results for orders placed or performed during the hospital encounter of 11/07/19   CBC WITH DIFFERENTIAL   Result Value Ref Range    WBC 8.0 4.8 - 10.8 K/uL    RBC 4.24 (L) 4.70 - 6.10 M/uL    Hemoglobin 13.2 (L) 14.0 - 18.0 g/dL    Hematocrit 39.8 (L) 42.0 - 52.0 %    MCV 93.9 80.0 - 94.0 fL    MCH 31.1 (H) 27.0 - 31.0 pg    MCHC 33.2 33.0 - 37.0 g/dL    RDW 13.8 11.5 - 14.5 %    Platelet Count 181 130 - 400 K/uL    MPV 9.7 7.4 - 10.4 fL    Neutrophils Automated 82.8 (H) 39.0 - 70.0 %    Lymphocytes Automated 9.3 (L) 21.0 - 50.0 %    Monocytes Automated 6.5 2.0 - 9.0 %    Eosinophils Automated 0.8 0.0 - 5.0 %    Basophils Automated 0.3 0.0 - 3.0 %    Abs Neutrophils Automated 6.6 1.8 - 7.7 K/uL    Abs Lymph Automated 0.7 (L) 1.2 - 4.8 K/uL    Eosinophil Count, Blood 0.06 0.00 - 0.50 K/uL   COMP METABOLIC PANEL   Result Value Ref Range    Sodium 138 136 - 145 mmol/L    Potassium 3.8 3.5 - 5.1 mmol/L    Chloride 99 98 - 107 mmol/L    Co2 27 21 - 32 mmol/L    Anion Gap 16 10 - 18 mmol/L    Glucose 125 (H) 74 - 99 mg/dL    Bun 41 (H) 7 - 18 mg/dL    Creatinine 1.4 (H) 0.8 - 1.3 mg/dL    Calcium 9.5 8.5 - 11.0 mg/dL    AST(SGOT) 25 15 - 37 U/L    ALT(SGPT) 27 12 - 78 U/L    Alkaline Phosphatase 158 (H) 46 - 116 U/L    Total Bilirubin 1.1 (H) 0.2 - 1.0 mg/dL    Albumin 3.8 3.4 - 5.0 g/dL    Total Protein 7.6 6.4 - 8.2 g/dL    A-G Ratio 1.0    LIPASE   Result Value Ref Range    Lipase 82 73 - 393 U/L   ESTIMATED GFR   Result Value Ref Range    GFR If African American 60 >60 mL/min/1.73 m 2    GFR If Non African American 50 (A) >60 mL/min/1.73 m 2   DIGOXIN   Result Value Ref Range    Digoxin 0.6 (L) 0.9 - 2.0 ng/mL   LACTIC ACID   Result Value Ref Range    Lactic Acid 1.0 0.4 - 2.0 mmol/L       All labs reviewed by me.    RADIOLOGY  OUTSIDE IMAGES-CT ABDOMEN /PELVIS   Final Result      I did review the outlying CT scan that revealed evidence of a Morgagni hernia with small bowel obstruction the  right chest wall.  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  The radiologist's interpretation of all radiological studies have been reviewed by me.    COURSE & MEDICAL DECISION MAKING   Pertinent Labs & Imaging studies reviewed. (See chart for details)    6:13 PM - Patient seen and examined at bedside. The patient is requesting pain medications. Discussed plan of care with patient which includes treating the patient for his symptoms, ordering lab work and consulting with surgery for further evaluation of his condition. Patient verbalizes his understanding and agreement to the plan of care. Patient will be treated with Dilaudid 1 mg and lactated ringers infusion 1,000 mL. Ordered EKG, CMP, CBC with differential, lactic acid, PTT, PT/INR to evaluate his symptoms.     HYDRATION: Based on the patient's presentation of Inability to take oral fluids the patient was given IV fluids. IV Hydration was used because oral hydration was not adequate alone.     6:24 PM Paged Dr. Cason (General Surgery) at this time.    6:29 PM I discussed the patient's case and the above findings with Dr. Cason (General Surgery) who agrees to evaluate the patient at bedside.    6:30 PM Recheck. The patient was updated on my conversation with Dr. Cason.     6:50 PM Paged Dr. Trevino (Hospitalist) at this time.    7:00 PM I discussed the patient's case and the above findings with Dr. Trevino (Hospitalist) who agrees to admit the patient.         This is a charming 74 y.o. male that presents with Mogagi hernia in the right chest wall.  Is possible the patient does have a small bowel obstruction as well.  Patient has no evidence of lactic acidosis, no evidence of leukocytosis or fever.  Discussed the patient with Dr. Cason for surgical intervention.  In addition I discussed the patient Dr. Trevino for admission to the hospital.  The patient will require surgical intervention secondary to a small bowel  obstruction within the intrathoracic cavity.  DISPOSITION:  Patient will be admitted to Dr. Trevino (Hospitalist) in guarded condition.      FINAL IMPRESSION  Magagni hernia  Small bowel obstruction secondary to the above     IPenny (Scribe), am scribing for, and in the presence of, Ahmet Mercado D.O    Electronically signed by: Penny Zhu (Scribe), 11/7/2019    IAhmet D.O. personally performed the services described in this documentation, as scribed by Penny Zhu in my presence, and it is both accurate and complete.    C    The note accurately reflects work and decisions made by me.  Ahmet Mercado  11/7/2019  7:57 PM

## 2019-11-08 NOTE — ASSESSMENT & PLAN NOTE
Resume home anti hypertensive medications when appropriate   Strict NPO for now   Prn hydralazine and labetalol ordered

## 2019-11-08 NOTE — PROGRESS NOTES
"/75   Pulse 67   Temp 36.8 °C (98.2 °F) (Temporal)   Resp 16   Ht 2.007 m (6' 7\")   Wt 107.9 kg (237 lb 14 oz)   SpO2 92%   BMI 26.80 kg/m²     HD # 1 - Abdominal pain   Diaphragmatic hernia on admit imaging    NPO  NG in place - 100 cc out    A&O x 4  No respiratory distress  Abdomen soft and generally non tender  BM PA per pt    Continue NG to suction  Dr. Cason consulting with partner with regards to possible robotic intervention       "

## 2019-11-08 NOTE — CARE PLAN
Problem: Communication  Goal: The ability to communicate needs accurately and effectively will improve  Outcome: PROGRESSING AS EXPECTED     Problem: Pain Management  Goal: Pain level will decrease to patient's comfort goal  Outcome: PROGRESSING AS EXPECTED     Problem: Safety  Goal: Will remain free from injury  Outcome: PROGRESSING AS EXPECTED  Goal: Will remain free from falls  Outcome: PROGRESSING AS EXPECTED

## 2019-11-08 NOTE — ASSESSMENT & PLAN NOTE
CT from OSH   1.  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 Emergent general surgical consultation recommended    Dr. Correa consulted at [facility],   Patient wishes for non-operative managmenet   IVF, anti emetics, pain control for now   NG tube to LIS   Strict NPO until cleared by surgery

## 2019-11-08 NOTE — ASSESSMENT & PLAN NOTE
Not on anticoagualtion d/t hx of GI bleed  Resume IV digoxin   Cont to monitor   Reviewed OP EKG - AF rate controlled

## 2019-11-08 NOTE — ASSESSMENT & PLAN NOTE
Avoid aggressive IVF at this time to prevent volume overload   Resume home arb, aldactone, lasix and BB when appropriate   IV Dig

## 2019-11-08 NOTE — CONSULTS
Surgery Consult  11/7/2019    .    HPI:  Jimmie Zamudio is a very pleasant 74 y.o. male.  He was transfer OSH diaphragm hernia  He report 1 day history abdominal pain  He reports pain on awakening this am  He states no similar previous pain  He was able to eat breakfast  He states pain now improved  He states pain was intense not associated fever nausea or vomiting    Past Medical History:   Diagnosis Date   • Arthritis    • Atrial fibrillation (HCC)    • Blood transfusion without reported diagnosis    • Cataract    • Congestive heart failure with left ventricular systolic dysfunction (HCC)     EF 25%   • Hepatitis A    • History of blood clots    • Hyperlipidemia    • Hypertension    • OA (osteoarthritis) of neck        Past Surgical History:   Procedure Laterality Date   • GASTROSCOPY N/A 5/14/2019    Procedure: GASTROSCOPY;  Surgeon: Zi Fam M.D.;  Location: SURGERY [facility];  Service: General   • GASTROSCOPY N/A 2/22/2019    Procedure: GASTROSCOPY;  Surgeon: Zi Fam M.D.;  Location: SURGERY [facility];  Service: General   • COLONOSCOPY N/A 2/22/2019    Procedure: COLONOSCOPY;  Surgeon: Zi Fam M.D.;  Location: SURGERY [facility];  Service: General   • CARPAL TUNNEL RELEASE     • KNEE REVISION TOTAL     • OTHER      Pain Pump   • OTHER      metal removed from back   • PB SPINAL FUSION,ANT,EA ADNL LEVEL      spinal fusion lower back   • TRIGGER FINGER RELEASE     • ZZZ IVC FILTER-CATH LAB      prior DVT       Current Facility-Administered Medications   Medication Dose Route Frequency Provider Last Rate Last Dose   • senna-docusate (PERICOLACE or SENOKOT S) 8.6-50 MG per tablet 2 Tab  2 Tab Oral BID Win Trevino M.D.        And   • polyethylene glycol/lytes (MIRALAX) PACKET 1 Packet  1 Packet Oral QDAY PRN Win Trevino M.D.        And   • magnesium hydroxide (MILK OF MAGNESIA) suspension 30 mL  30 mL Oral QDAY PRN Win Trevino M.D.        And   • bisacodyl  (DULCOLAX) suppository 10 mg  10 mg Rectal QDAY PRN Win Trevino M.D.       • NS infusion   Intravenous Continuous Win Trevino M.D.       • ondansetron (ZOFRAN) syringe/vial injection 4 mg  4 mg Intravenous Q4HRS PRN Win Trevino M.D.       • ondansetron (ZOFRAN ODT) dispertab 4 mg  4 mg Oral Q4HRS PRN Win Trevino M.D.       • Pharmacy Consult Request ...Pain Management Review 1 Each  1 Each Other PHARMACY TO DOSE Win Trevino M.D.        And   • oxyCODONE immediate-release (ROXICODONE) tablet 5 mg  5 mg Oral Q3HRS PRN Win Trevino M.D.        And   • oxyCODONE immediate-release (ROXICODONE) tablet 10 mg  10 mg Oral Q3HRS PRN Win Trevino M.D.        And   • HYDROmorphone pf (DILAUDID) injection 0.5 mg  0.5 mg Intravenous Q3HRS PRN Win Trevino M.D.       • [START ON 11/8/2019] carvedilol (COREG) tablet 3.125 mg  3.125 mg Oral DAILY Win Trevino M.D.       • [START ON 11/8/2019] furosemide (LASIX) tablet 20 mg  20 mg Oral DAILY Win Trevino M.D.       • gabapentin (NEURONTIN) capsule 600 mg  600 mg Oral TID Win Trevino M.D.       • losartan (COZAAR) tablet 25 mg  25 mg Oral BID Win Trevino M.D.       • pravastatin (PRAVACHOL) tablet 40 mg  40 mg Oral Nightly Win Trevino M.D.       • spironolactone (ALDACTONE) tablet 25 mg  25 mg Oral BID Win Trevino M.D.       • digoxin (LANOXIN) tablet 125 mcg  125 mcg Oral DAILY AT 1800 Win Trevino M.D.       • omeprazole (PRILOSEC) capsule 20 mg  20 mg Oral BID Win Trevino M.D.         Current Outpatient Medications   Medication Sig Dispense Refill   • Calcium Carb-Cholecalciferol (CALCIUM-VITAMIN D) 600-400 MG-UNIT Tab Take 1 Tab by mouth every day.     • carvedilol (COREG) 3.125 MG Tab Take 3.125 mg by mouth every day.     • furosemide (LASIX) 20 MG Tab Take 20 mg by mouth every day.     • gabapentin (NEURONTIN) 300 MG Cap Take 600 mg by mouth 3 times a day.     • losartan (COZAAR) 25 MG Tab  Take 25 mg by mouth 2 Times a Day.     • pantoprazole (PROTONIX) 40 MG Tablet Delayed Response Take 40 mg by mouth 2 Times a Day.     • polyethylene glycol/lytes (MIRALAX) Pack Take 17 g by mouth every day.     • pravastatin (PRAVACHOL) 40 MG tablet Take 40 mg by mouth every evening.     • spironolactone (ALDACTONE) 25 MG Tab Take 25 mg by mouth 2 Times a Day.     • docusate sodium (COLACE) 100 MG Cap Take 100 mg by mouth 3 times a day.     • Probiotic Product (PROBIOTIC PO) Take 1 Cap by mouth every day.     • Ferrous Sulfate (FEOSOL PO) Take 1 Tab by mouth every day.     • Pain Pump (PATIENT SUPPLIED) XX PARVIZ by Injection route Continuous. Patient's Pain Pump (placed and maintained as an outpatient)  Medications/concentrations:   HYDROMORPHONE 2.991 mg/day  BUPIVICAINE 4.985 m/day  Route: unk  Date of Placement: Sept 2015  Last changed: k  Continuous infusion rates (Drug/Rate):  unk  Patient activation dose: k  Patient activation lockout interval: 3  Maximum activations per day: 3   Dr. Rankin     • doxycycline (VIBRAMYCIN) 100 MG Tab Take 100 mg by mouth 2 times a day.     • Calcium Citrate-Vitamin D (CALCIUM + D PO) Take 1 Cap by mouth every day.         Social History     Socioeconomic History   • Marital status:      Spouse name: Not on file   • Number of children: Not on file   • Years of education: Not on file   • Highest education level: Not on file   Occupational History   • Not on file   Social Needs   • Financial resource strain: Not on file   • Food insecurity:     Worry: Not on file     Inability: Not on file   • Transportation needs:     Medical: Not on file     Non-medical: Not on file   Tobacco Use   • Smoking status: Former Smoker   • Smokeless tobacco: Never Used   Substance and Sexual Activity   • Alcohol use: No     Alcohol/week: 0.0 oz   • Drug use: No   • Sexual activity: Not on file   Lifestyle   • Physical activity:     Days per week: Not on file     Minutes per session: Not on  "file   • Stress: Not on file   Relationships   • Social connections:     Talks on phone: Not on file     Gets together: Not on file     Attends Jainism service: Not on file     Active member of club or organization: Not on file     Attends meetings of clubs or organizations: Not on file     Relationship status: Not on file   • Intimate partner violence:     Fear of current or ex partner: Not on file     Emotionally abused: Not on file     Physically abused: Not on file     Forced sexual activity: Not on file   Other Topics Concern   • Not on file   Social History Narrative   • Not on file       Family History   Problem Relation Age of Onset   • Heart Disease Father    • Heart Attack Father    • Lung Disease Father        Allergies:  Patient has no known allergies.    Review of Systems:  Chronic pain back, abdominal pain shortness of breath as above otherwise negative    Physical Exam:  /94   Pulse 82   Temp 36.9 °C (98.4 °F) (Temporal)   Resp 16   Ht 2.007 m (6' 7\")   Wt 108.9 kg (240 lb)   SpO2 98%     Constitutional: Awake, alert, oriented x3. No acute distress. GCS 15. E4 V5 M6.  Head: Normocephalic atraumatic  Neck: No tracheal deviation. No Stridor.  Cardiovascular: Normal rate, skin warm brisk cap refill  Pulmonary/Chest: supplemental oxygen no distress  Abdominal: Soft, nondistended. Nontender to palpation. Pelvis is stable to anterior-posterior compression..   Musculoskeletal:warm dry  Neurological: awake alert interactive gcs 15 cooperative friendly   Skin: Skin is warm and dry.  No diaphoresis. No erythema. No pallor.   Psychiatric:  Normal mood and affect.  Behavior is appropriate.       Labs:  Recent Labs     11/07/19  1315 11/07/19  1830   WBC 8.0 8.9   RBC 4.24* 4.20*   HEMOGLOBIN 13.2* 13.4*   HEMATOCRIT 39.8* 40.5*   MCV 93.9 96.4   MCH 31.1* 31.9   MCHC 33.2 33.1*   RDW 13.8 49.0   PLATELETCT 181 157*   MPV 9.7 10.1     Recent Labs     11/07/19  1326 11/07/19  1830   SODIUM 138 134* "   POTASSIUM 3.8 4.2   CHLORIDE 99 98   CO2 27 27   GLUCOSE 125* 120*   BUN 41* 39*   CREATININE 1.4* 1.17   CALCIUM 9.5 9.7     Recent Labs     11/07/19  1830   APTT 26.1   INR 1.09     Recent Labs     11/07/19  1326 11/07/19  1830   ASTSGOT 25 22   ALTSGPT 27 18   TBILIRUBIN 1.1* 1.3   ALKPHOSPHAT 158* 129*   GLOBULIN  --  3.0   INR  --  1.09       Radiology:  OUTSIDE IMAGES-CT ABDOMEN /PELVIS   Final Result      DX-ABDOMEN FOR TUBE PLACEMENT    (Results Pending)         Assessment: This is a 74 y.o. male  Transfer diaphragm hernia concern bowel obstruction    Plan:  Discussed findings with Jimmie Zamudio  discussed safest option surgery  He demonstrated understanding he states he feels better now comfortable and wants to try non operative therapy  discussed risk to include but not limited to progression, worse outcome sepsis, death   He demonstrated understanding but declines  He states he would be willing to re discuss if pain returns or other symptoms  Close monitoring and support  Monitor exam            Ammon Cason MD, FACS  [facility]   [phone]

## 2019-11-08 NOTE — ED TRIAGE NOTES
"[place] [facility] [place]. Pt given 1mg dilaudid en route. Requesting pain medication on arrival. Pt with bilateral LE edema, states \"chronic\".   "

## 2019-11-08 NOTE — PROGRESS NOTES
Hospital Medicine Daily Progress Note    Date of Service  11/8/2019    Chief Complaint  74 y.o. male admitted 11/7/2019 with small bowel obstruction    Hospital Course    Patient is a 74-year-old male with history of AF not on anticoagulation due to hx of GI bleed w anemia, CHF with EF of 25%, mild to moderate valvular disease, pulmonary hypertension, HLD, HTN who presented with abdominal pain.  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  General surgery consultation was obtained but the patient feels improved and has deferred surgery at present despite the risks. NGT was placed to LCS. He has history of multiple abdominal surgeries including ALIF and hernia repair.      Interval Problem Update  T-max overnight 98.9  P 60, RR 16, 90% on RA, /71  WBCs normal  Mild normocytic anemia  Low digoxin level  No pain  No N/V  +BS    Consultants/Specialty  General surgery    Code Status  Full    Disposition  TBD    Review of Systems  Review of Systems   Constitutional: Negative for chills, fever and weight loss.   Respiratory: Negative for cough, shortness of breath and wheezing.    Cardiovascular: Negative for chest pain, palpitations and orthopnea.   Gastrointestinal: Negative for abdominal pain, blood in stool, diarrhea, melena, nausea and vomiting.   Genitourinary: Negative for dysuria, frequency, hematuria and urgency.   All other systems reviewed and are negative.       Physical Exam  Temp:  [36.4 °C (97.5 °F)-37.2 °C (98.9 °F)] 37.2 °C (98.9 °F)  Pulse:  [58-94] 60  Resp:  [16] 16  BP: (120-138)/(71-94) 120/71  SpO2:  [89 %-98 %] 90 %    Physical Exam  Vitals signs and nursing note reviewed.   HENT:      Head: Normocephalic and atraumatic.      Nose: Nose normal.   Eyes:      Conjunctiva/sclera: Conjunctivae normal.      Pupils: Pupils are equal, round, and reactive to light.   Neck:      Musculoskeletal: Neck supple.   Cardiovascular:      Rate and Rhythm: Normal rate. Rhythm irregular.       Heart sounds: No murmur. No friction rub.   Pulmonary:      Effort: No respiratory distress.   Abdominal:      General: Bowel sounds are normal. There is no distension.      Palpations: Abdomen is soft.      Comments: Firm bladder vs intra-abd scarring from prior sx   Musculoskeletal:      Right lower leg: Edema present.      Left lower leg: Edema present.   Skin:     General: Skin is warm and dry.      Comments: BLE venous stasis changes   Neurological:      Mental Status: He is alert.         Fluids    Intake/Output Summary (Last 24 hours) at 11/8/2019 0856  Last data filed at 11/8/2019 0535  Gross per 24 hour   Intake 600 ml   Output 430 ml   Net 170 ml       Laboratory  Recent Labs     11/07/19  1315 11/07/19  1830 11/08/19  0443   WBC 8.0 8.9 5.3   RBC 4.24* 4.20* 3.88*   HEMOGLOBIN 13.2* 13.4* 12.0*   HEMATOCRIT 39.8* 40.5* 37.5*   MCV 93.9 96.4 96.6   MCH 31.1* 31.9 30.9   MCHC 33.2 33.1* 32.0*   RDW 13.8 49.0 49.6   PLATELETCT 181 157* 161*   MPV 9.7 10.1 9.9     Recent Labs     11/07/19  1326 11/07/19  1830 11/08/19  0443   SODIUM 138 134* 136   POTASSIUM 3.8 4.2 4.0   CHLORIDE 99 98 100   CO2 27 27 30   GLUCOSE 125* 120* 93   BUN 41* 39* 33*   CREATININE 1.4* 1.17 1.07   CALCIUM 9.5 9.7 9.6     Recent Labs     11/07/19  1830   APTT 26.1   INR 1.09               Imaging  DX-ABDOMEN FOR TUBE PLACEMENT   Final Result         1.  Air-filled distended loops of bowel are seen with reactive mucosal pattern, appearance suggests ileus or enteritis. Recommend radiographic followup to resolution to exclude progression to obstruction.   2.  Nasogastric tube tip terminates overlying the expected location of the gastric body.   3.  Right Calvo omar is fractured in 2 locations.      DX-ABDOMEN FOR TUBE PLACEMENT   Final Result         1.  Air-filled distended loops of bowel are seen with reactive mucosal pattern, appearance suggests ileus or enteritis. Recommend radiographic followup to resolution to exclude  progression to obstruction.   2.  Nasogastric tube tip terminates overlying the expected location of the gastroesophageal junction, recommend advancement.   3.  Bilateral lower lobe infiltrates.   4.  Right Calvo omar appears fractured in 2 locations.      OUTSIDE IMAGES-CT ABDOMEN /PELVIS   Final Result           Assessment/Plan  * Small bowel obstruction (HCC)  Assessment & Plan  CT from OSH   1.  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 Emergent general surgical consultation recommended    Dr. Correa consulted at [facility],   Patient wishes for non-operative managmenet   IVF, anti emetics, pain control for now   NG tube to LIS   Strict NPO until cleared by surgery     Thrombocytopenia (HCC)  Assessment & Plan  Mild, cont to montior     Anemia  Assessment & Plan  Mild, no evidence of bleeding   Cont to montior     Hyponatremia  Assessment & Plan  Hypovolemic, cont with IVF and montior    Elevated BUN  Assessment & Plan  IVF and repeat labs in am daily  Bladder scan    Morgagni hernia  Assessment & Plan  Noted on CT scan   Surgery has been consulted for evaluation     Atrial fibrillation (HCC)  Assessment & Plan  Not on anticoagualtion d/t hx of GI bleed  Resume IV digoxin   Cont to monitor   Reviewed OP EKG - AF rate controlled    Dilated cardiomyopathy (HCC)- (present on admission)  Assessment & Plan  Avoid aggressive IVF at this time to prevent volume overload   Resume home arb, aldactone, lasix and BB when appropriate   IV Dig    Hyperlipidemia- (present on admission)  Assessment & Plan  Resume home statin therapy when appropriate    Essential hypertension- (present on admission)  Assessment & Plan  Resume home anti hypertensive medications when appropriate   Strict NPO for now   Prn hydralazine and labetalol ordered       VTE prophylaxis: SCDs

## 2019-11-08 NOTE — ED NOTES
Medication Reconciliation partially completed per pt at bedside with list  Reviewed list and returned to pt at bedside  Allergies reviewed   No oral ABX within the last 14 days      Med rec to follow up with Dr. Rankin when they reopen to verify pain pump

## 2019-11-08 NOTE — DISCHARGE PLANNING
Care Transition Team Assessment      Anticipated Discharge Disposition: Home    Action: RN CM assessed pt at bedside and verified facesheet demographics.  Pt reports he lives in a single story home with his wife, adult daughter, granddaughter and several great grandchildren in [place].  He is independent with ADLs and IADLs and reports using a cane at home. Pt also reports having a walker available if needed. Pt is retired, has prescription drug coverage, uses [facility] and a mail order pharmacy, and reports no financial barriers at this time. Pt's PCP is Zi aVldez MD. Pt anticipates discharging home with no needs when medically cleared.     Barriers to Discharge: Medical clearance for discharge pending.  NGT in place; pt currently opting for non operative therapy.    Plan: Await medical clearance for discharge home.  TIMO VIZCARRA will remain available to assist with any discharge planning needs.     Information Source  Orientation : Oriented x 4  Information Given By: Patient  Who is responsible for making decisions for patient? : Patient    Readmission Evaluation  Is this a readmission?: No    Elopement Risk  Legal Hold: No  Ambulatory or Self Mobile in Wheelchair: Yes  Disoriented: No  Psychiatric Symptoms: None  History of Wandering: No  Elopement this Admit: No  Vocalizing Wanting to Leave: No  Displays Behaviors, Body Language Wanting to Leave: No-Not at Risk for Elopement  Elopement Risk: Not at Risk for Elopement    Interdisciplinary Discharge Planning  Primary Care Physician: Zi Valdez MD  Lives with - Patient's Self Care Capacity: Adult Children, Child Less than 18 Years of Age, Spouse  Patient or legal guardian wants to designate a caregiver (see row info): No  Support Systems: Spouse / Significant Other, Family Member(s), Children  Housing / Facility: 1 Story House  Able to Return to Previous ADL's: Yes  Mobility Issues: Yes  Prior Services: Home-Independent  Patient Expects to be Discharged to::  Home  Assistance Needed: Unknown at this Time  Durable Medical Equipment: Walker, Other - Specify(Cane)    Discharge Preparedness  What is your plan after discharge?: Home with help  What are your discharge supports?: Spouse, Child  Prior Functional Level: Ambulatory, Drives Self, Independent with Activities of Daily Living, Independent with Medication Management, Uses Cane(Has walker available to use as needed)  Difficulity with ADLs: Walking  Difficulty with ADLs Comment: Uses cane  Difficulity with IADLs: None    Functional Assesment  Prior Functional Level: Ambulatory, Drives Self, Independent with Activities of Daily Living, Independent with Medication Management, Uses Cane(Has walker available to use as needed)    Finances  Financial Barriers to Discharge: No  Prescription Coverage: Yes              Advance Directive  Advance Directive?: None    Domestic Abuse  Have you ever been the victim of abuse or violence?: No  Physical Abuse or Sexual Abuse: No  Verbal Abuse or Emotional Abuse: No  Possible Abuse Reported to:: Not Applicable         Discharge Risks or Barriers  Discharge risks or barriers?: No    Anticipated Discharge Information  Anticipated discharge disposition: Home  Discharge Address: [address] [place] [address] [address] [place]  Discharge Contact Phone Number: [phone]

## 2019-11-09 VITALS
HEART RATE: 92 BPM | DIASTOLIC BLOOD PRESSURE: 97 MMHG | WEIGHT: 234.13 LBS | RESPIRATION RATE: 16 BRPM | OXYGEN SATURATION: 98 % | HEIGHT: 78 IN | BODY MASS INDEX: 27.09 KG/M2 | TEMPERATURE: 97.1 F | SYSTOLIC BLOOD PRESSURE: 148 MMHG

## 2019-11-09 PROBLEM — K56.609 SMALL BOWEL OBSTRUCTION (HCC): Status: RESOLVED | Noted: 2019-11-07 | Resolved: 2019-11-09

## 2019-11-09 LAB
ANION GAP SERPL CALC-SCNC: 11 MMOL/L (ref 0–11.9)
BUN SERPL-MCNC: 24 MG/DL (ref 8–22)
CALCIUM SERPL-MCNC: 10.1 MG/DL (ref 8.5–10.5)
CHLORIDE SERPL-SCNC: 103 MMOL/L (ref 96–112)
CO2 SERPL-SCNC: 25 MMOL/L (ref 20–33)
CREAT SERPL-MCNC: 0.96 MG/DL (ref 0.5–1.4)
ERYTHROCYTE [DISTWIDTH] IN BLOOD BY AUTOMATED COUNT: 49.4 FL (ref 35.9–50)
GLUCOSE SERPL-MCNC: 89 MG/DL (ref 65–99)
HCT VFR BLD AUTO: 41.6 % (ref 42–52)
HGB BLD-MCNC: 13.2 G/DL (ref 14–18)
MAGNESIUM SERPL-MCNC: 1.8 MG/DL (ref 1.5–2.5)
MCH RBC QN AUTO: 31 PG (ref 27–33)
MCHC RBC AUTO-ENTMCNC: 31.7 G/DL (ref 33.7–35.3)
MCV RBC AUTO: 97.7 FL (ref 81.4–97.8)
PHOSPHATE SERPL-MCNC: 2.8 MG/DL (ref 2.5–4.5)
PLATELET # BLD AUTO: 170 K/UL (ref 164–446)
PMV BLD AUTO: 9.8 FL (ref 9–12.9)
POTASSIUM SERPL-SCNC: 4.1 MMOL/L (ref 3.6–5.5)
RBC # BLD AUTO: 4.26 M/UL (ref 4.7–6.1)
SODIUM SERPL-SCNC: 139 MMOL/L (ref 135–145)
WBC # BLD AUTO: 5.6 K/UL (ref 4.8–10.8)

## 2019-11-09 PROCEDURE — 83735 ASSAY OF MAGNESIUM: CPT

## 2019-11-09 PROCEDURE — 36415 COLL VENOUS BLD VENIPUNCTURE: CPT

## 2019-11-09 PROCEDURE — 700105 HCHG RX REV CODE 258: Performed by: NURSE PRACTITIONER

## 2019-11-09 PROCEDURE — 84100 ASSAY OF PHOSPHORUS: CPT

## 2019-11-09 PROCEDURE — 700111 HCHG RX REV CODE 636 W/ 250 OVERRIDE (IP): Performed by: NURSE PRACTITIONER

## 2019-11-09 PROCEDURE — 80048 BASIC METABOLIC PNL TOTAL CA: CPT

## 2019-11-09 PROCEDURE — 85027 COMPLETE CBC AUTOMATED: CPT

## 2019-11-09 PROCEDURE — 99239 HOSP IP/OBS DSCHRG MGMT >30: CPT | Performed by: HOSPITALIST

## 2019-11-09 RX ORDER — MAGNESIUM SULFATE HEPTAHYDRATE 40 MG/ML
2 INJECTION, SOLUTION INTRAVENOUS ONCE
Status: COMPLETED | OUTPATIENT
Start: 2019-11-09 | End: 2019-11-09

## 2019-11-09 RX ADMIN — HEPARIN SODIUM 5000 UNITS: 5000 INJECTION, SOLUTION INTRAVENOUS; SUBCUTANEOUS at 05:49

## 2019-11-09 RX ADMIN — MAGNESIUM SULFATE 2 G: 2 INJECTION INTRAVENOUS at 07:12

## 2019-11-09 RX ADMIN — SODIUM CHLORIDE: 9 INJECTION, SOLUTION INTRAVENOUS at 00:55

## 2019-11-09 NOTE — CARE PLAN
Problem: Communication  Goal: The ability to communicate needs accurately and effectively will improve  Outcome: PROGRESSING AS EXPECTED   Participation in POC  Problem: Safety  Goal: Will remain free from injury  Outcome: PROGRESSING AS EXPECTED   Fall precautions in place  Problem: Fluid Volume:  Goal: Will maintain balanced intake and output  Outcome: PROGRESSING AS EXPECTED   IV fluids decreased

## 2019-11-09 NOTE — PROGRESS NOTES
Bedside report received. Assessment complete.  A&O x 4. Patient calls appropriately.  Patient up x1 with FWW Bed alarm on.   Patient denies pain  Denies N&V. Strict NPO  NG in right nare to low intermittent suction. Placement verified. Little output, brown/green  +pules VIKAS and BLE. +edema +1 in LLE. +2 generalized in RLE  + void, + flatus,  LBM PTA.  Patient denies SOB.  SCD's on  Review plan with of care with patient. Call light and personal belongings with in reach. Hourly rounding in place. All needs met at this time.

## 2019-11-09 NOTE — CARE PLAN
Problem: Safety  Goal: Will remain free from falls  Outcome: PROGRESSING AS EXPECTED  Intervention: Implement fall precautions  Note:   Fall precautions in place. Patient educated on use of call light      Problem: Pain Management  Goal: Pain level will decrease to patient's comfort goal  Outcome: PROGRESSING AS EXPECTED  Intervention: Educate and implement non-pharmacologic comfort measures. Examples: relaxation, distration, play therapy, activity therapy, massage, etc.  Note:   Pain being controlled with pain pump and rest and distraction

## 2019-11-09 NOTE — PROGRESS NOTES
Discharging Patient home per physician order.  Discharged with family.  Demonstrated understanding of discharge instructions, follow up appointments, home medications, prescriptions, home care for surgical wound and nursing care instructions for follow up appointments.  Ambulating with cane assistance, voiding without difficulty, pain well controlled, tolerating oral medications, oxygen saturation greater than 90%, tolerating diet.  Educational handouts heart failure education given and discussed.  Verbalized understanding of discharge instructions and educational handouts.  All questions answered.  Belongings with patient at time of discharge.

## 2019-11-09 NOTE — CARE PLAN
Problem: Communication  Goal: The ability to communicate needs accurately and effectively will improve  11/9/2019 0758 by Nidhi Sanchez R.N.  Outcome: PROGRESSING AS EXPECTED  Participation in POC     Problem: Safety  Goal: Will remain free from injury  11/9/2019 0758 by Nidhi Sanchez R.N.  Outcome: PROGRESSING AS EXPECTED     Fall precautions in place

## 2019-11-09 NOTE — DISCHARGE INSTRUCTIONS
Discharge Instructions    Discharged to home by car with relative. Discharged via wheelchair, hospital escort: Yes.  Special equipment needed: Not Applicable    Be sure to schedule a follow-up appointment with your primary care doctor or any specialists as instructed.     Discharge Plan:   Diet Plan: Discussed  Activity Level: Discussed  Confirmed Follow up Appointment: Patient to Call and Schedule Appointment  Confirmed Symptoms Management: Discussed  Medication Reconciliation Updated: Yes  Influenza Vaccine Indication: Not indicated: Previously immunized this influenza season and > 8 years of age    I understand that a diet low in cholesterol, fat, and sodium is recommended for good health. Unless I have been given specific instructions below for another diet, I accept this instruction as my diet prescription.   Other diet: GI Soft diet    Special Instructions: None      Low-Fiber Diet  Fiber is found in fruits, vegetables, and whole grains. A low-fiber diet restricts fibrous foods that are not digested in the small intestine. A diet containing about 10-15 grams of fiber per day is considered low fiber. Low-fiber diets may be used to:  · Promote healing and rest the bowel during intestinal flare-ups.  · Prevent blockage of a partially obstructed or narrowed gastrointestinal tract.  · Reduce fecal weight and volume.  · Slow the movement of feces.  You may be on a low-fiber diet as a transitional diet following surgery, after an injury (trauma), or because of a short (acute) or lifelong (chronic) illness. Your health care provider will determine the length of time you need to stay on this diet.  What do I need to know about a low-fiber diet?  Always check the fiber content on the packaging's Nutrition Facts label, especially on foods from the grains list. Ask your dietitian if you have questions about specific foods that are related to your condition, especially if the food is not listed below. In general, a  low-fiber food will have less than 2 g of fiber.  What foods can I eat?  Grains   All breads and crackers made with white flour. Sweet rolls, doughnuts, waffles, pancakes, [place] toast, bagels. Pretzels, [place] toast, zwieback. Well-cooked cereals, such as cornmeal, farina, or cream cereals. Dry cereals that do not contain whole grains, fruit, or nuts, such as refined corn, wheat, rice, and oat cereals. Potatoes prepared any way without skins, plain pastas and noodles, refined white rice. Use white flour for baking and making sauces. Use allowed list of grains for casseroles, dumplings, and puddings.  Vegetables   Strained tomato and vegetable juices. Fresh lettuce, cucumber, spinach. Well-cooked (no skin or pulp) or canned vegetables, such as asparagus, bean sprouts, beets, carrots, green beans, mushrooms, potatoes, pumpkin, spinach, yellow squash, tomato sauce/puree, turnips, yams, and zucchini. Keep servings limited to ½ cup.  Fruits   All fruit juices except prune juice. Cooked or canned fruits without skin and seeds, such as applesauce, apricots, cherries, fruit cocktail, grapefruit, grapes, mandarin oranges, melons, peaches, pears, pineapple, and plums. Fresh fruits without skin, such as apricots, avocados, bananas, melons, pineapple, nectarines, and peaches. Keep servings limited to ½ cup or 1 piece.  Meat and Other Protein Sources   Ground or well-cooked tender beef, ham, veal, lamb, pork, or poultry. Eggs, plain cheese. Fish, oysters, shrimp, lobster, and other seafood. Liver, organ meats. Smooth nut butters.  Dairy   All milk products and alternative dairy substitutes, such as soy, rice, almond, and coconut, not containing added whole nuts, seeds, or added fruit.  Beverages   Decaf coffee, fruit, and vegetable juices or smoothies (small amounts, with no pulp or skins, and with fruits from allowed list), sports drinks, herbal tea.  Condiments   Ketchup, mustard, vinegar, cream sauce, cheese sauce, cocoa  powder. Spices in moderation, such as allspice, basil, bay leaves, celery powder or leaves, cinnamon, cumin powder, lake powder, ryan, mace, marjoram, onion or garlic powder, oregano, paprika, parsley flakes, ground pepper, rosemary, estela, savory, tarragon, thyme, and turmeric.  Sweets and Desserts   Plain cakes and cookies, pie made with allowed fruit, pudding, custard, cream pie. Gelatin, fruit, ice, sherbet, frozen ice pops. Ice cream, ice milk without nuts. Plain hard candy, honey, jelly, molasses, syrup, sugar, chocolate syrup, gumdrops, marshmallows. Limit overall sugar intake.  Fats and Oil   Margarine, butter, cream, mayonnaise, salad oils, plain salad dressings made from allowed foods. Choose healthy fats such as olive oil, canola oil, and omega-3 fatty acids (such as found in salmon or tuna) when possible.  Other   Bouillon, broth, or cream soups made from allowed foods. Any strained soup. Casseroles or mixed dishes made with allowed foods.  The items listed above may not be a complete list of recommended foods or beverages. Contact your dietitian for more options.   What foods are not recommended?  Grains   All whole wheat and whole grain breads and crackers. Multigrains, rye, bran seeds, nuts, or coconut. Cereals containing whole grains, multigrains, bran, coconut, nuts, raisins. Cooked or dry oatmeal, steel-cut oats. Coarse wheat cereals, granola. Cereals advertised as high fiber. Potato skins. Whole grain pasta, wild or brown rice. Popcorn. Coconut flour. Bran, buckwheat, corn bread, multigrains, rye, wheat germ.  Vegetables   Fresh, cooked or canned vegetables, such as artichokes, asparagus, beet greens, broccoli, [place] sprouts, cabbage, celery, cauliflower, corn, eggplant, kale, legumes or beans, okra, peas, and tomatoes. Avoid large servings of any vegetables, especially raw vegetables.  Fruits   Fresh fruits, such as apples with or without skin, berries, cherries, figs, grapes, grapefruit,  guavas, kiwis, mangoes, oranges, papayas, pears, persimmons, pineapple, and pomegranate. Prune juice and juices with pulp, stewed or dried prunes. Dried fruits, dates, raisins. Fruit seeds or skins. Avoid large servings of all fresh fruits.  Meats and Other Protein Sources   Tough, fibrous meats with gristle. [place] nut butter. Cheese made with seeds, nuts, or other foods not recommended. Nuts, seeds, legumes (beans, including baked beans), dried peas, beans, lentils.  Dairy   Yogurt or cheese that contains nuts, seeds, or added fruit.  Beverages   Fruit juices with high pulp, prune juice. Caffeinated coffee and teas.  Condiments   Coconut, maple syrup, pickles, olives.  Sweets and Desserts   Desserts, cookies, or candies that contain nuts or coconut, chunky peanut butter, dried fruits. Jams, preserves with seeds, marmalade. Large amounts of sugar and sweets. Any other dessert made with fruits from the not recommended list.  Other   Soups made from vegetables that are not recommended or that contain other foods not recommended.  The items listed above may not be a complete list of foods and beverages to avoid. Contact your dietitian for more information.   This information is not intended to replace advice given to you by your health care provider. Make sure you discuss any questions you have with your health care provider.  Document Released: 06/09/2003 Document Revised: 05/25/2017 Document Reviewed: 11/10/2014  [employer] Interactive Patient Education © 2017 [employer]           Depression / Suicide Risk    As you are discharged from this [facility] facility, it is important to learn how to keep safe from harming yourself.    Recognize the warning signs:  · Abrupt changes in personality, positive or negative- including increase in energy   · Giving away possessions  · Change in eating patterns- significant weight changes-  positive or negative  · Change in sleeping patterns- unable to sleep or sleeping all the  time   · Unwillingness or inability to communicate  · Depression  · Unusual sadness, discouragement and loneliness  · Talk of wanting to die  · Neglect of personal appearance   · Rebelliousness- reckless behavior  · Withdrawal from people/activities they love  · Confusion- inability to concentrate     If you or a loved one observes any of these behaviors or has concerns about self-harm, here's what you can do:  · Talk about it- your feelings and reasons for harming yourself  · Remove any means that you might use to hurt yourself (examples: pills, rope, extension cords, firearm)  · Get professional help from the community (Mental Health, Substance Abuse, psychological counseling)  · Do not be alone:Call your Safe Contact- someone whom you trust who will be there for you.  · Call your local CRISIS HOTLINE 979-5690 or 772-893-5215  · Call your local Children's Mobile Crisis Response Team [place] (677) 455-0912 or www.RadioFrame  · Call the toll free National Suicide Prevention Hotlines   · National Suicide Prevention Lifeline 758-844-WHBS (4949)  · [place] Line Network 800-SUICIDE (975-2349)                Discharge Instructions per Felix Gomez, A.P.N.    1.  Review your discharge Medication Reconciliation form as you may be provided with new prescriptions or changes to previously prescribed medications.  Be sure to take all of your prescribed medications exactly as directed.  Further questions can be directed to your local pharmacist or primary care provider (PCP).    2. Follow-up with your PCP.  An appointment may have already been scheduled for you.  Please review your discharge paperwork and locate this information.  Please be sure to call your PCP to cancel if you are unable to make this appointment or require schedule changes.  It is critical to to follow-up with your PCP to review hospital records and ensure any further diagnostic work-up, prescription refills, or referrals.     3. ACTIVITIES:  After discharge from the hospital, you may resume routine activities including walking and going u/down stairs. However, no strenuous activity. For further clarification, call your PCP     4. DRIVING: You may drive whenever you are off pain medications and are able to perform the activities needed to drive, i.e. turning, bending, twisting, etc.     5. BOWEL FUNCTION: A few patients, after hospitalizations, will develop bowel irregularity. You could also experience constipation due to pain medication and decreased activity level. If you feel this is occurring, please take an over-the-counter laxative (Milk of Magnesia, Ex-Lax, Senokot, etc.) until the problem has resolved.     6. HEART FAILURE MEDICATIONS: He did not receive your heart failure medications with the exception of digoxin on the day of discharge.  He may slowly resume these at home if your systolic blood pressure is above 100.  He may resume your beta-blocker if your pulse is greater than 60 and your systolic blood pressure is also above 100.  Monitor your blood pressure daily to ensure your blood pressure does not drop too low with the resumption of these medications.  Continue to manage your heart failure by measuring your weight daily, reducing your daily intake of sodium to 1500 mg, and call your cardiologist with any rapid changes in weight or problems with breathing.    Return to ER if you develop recurrent chest pain, shortness of breath, bloody sputum, fever of 101.5 or greater, intractable nausea or vomiting, blood in the vomit or urine, intractable pain, new onset inability to ambulate, focal motor weakness, acute vision disturbance, acute speech disturbance, intractable abdominal pain, or any other worrisome symptoms.          Small Bowel Obstruction  A small bowel obstruction means that something is blocking the small bowel. The small bowel is also called the small intestine. It is the long tube that connects the stomach to the colon. An  obstruction will stop food and fluids from passing through the small bowel. Treatment depends on what is causing the problem and how bad the problem is.  Follow these instructions at home:  · Get a lot of rest.  · Follow your diet as told by your doctor. You may need to:  ¨ Only drink clear liquids until you start to get better.  ¨ Avoid solid foods as told by your doctor.  · Take over-the-counter and prescription medicines only as told by your doctor.  · Keep all follow-up visits as told by your doctor. This is important.  Contact a doctor if:  · You have a fever.  · You have chills.  Get help right away if:  · You have pain or cramps that get worse.  · You throw up (vomit) blood.  · You have a feeling of being sick to your stomach (nausea) that does not go away.  · You cannot stop throwing up.  · You cannot drink fluids.  · You feel confused.  · You feel dry or thirsty (dehydrated).  · Your belly gets more bloated.  · You feel weak or you pass out (faint).  This information is not intended to replace advice given to you by your health care provider. Make sure you discuss any questions you have with your health care provider.  Document Released: 01/25/2006 Document Revised: 08/14/2017 Document Reviewed: 02/11/2016  [employer] Interactive Patient Education © 2017 [employer]

## 2019-11-09 NOTE — PROGRESS NOTES
"/97   Pulse 92   Temp 36.2 °C (97.1 °F) (Temporal)   Resp 16   Ht 2.007 m (6' 7\")   Wt 106.2 kg (234 lb 2.1 oz)   SpO2 98%   BMI 26.38 kg/m²     HD # 2 - Abdominal pain   Diaphragmatic hernia on admit imaging    No N/V  Denies abdominal pain   cc / 24 hours     A&O x 4  Respiratory rate even and unlabored  Abd soft  Non tender     NG removed  Clears and advance to soft as tolerated  If tolerates diet - would be OK for home    Follow up with Dr. Zurita outpatient for possible Robotic repair.     "

## 2019-11-09 NOTE — PROGRESS NOTES
Assumed care at 0700    Received report from NOC shift RN.    Reviewed recent lab results, notes, orders, and MAR  POC discussed and updated on care board  Bed is in the lowest and locked position, call light within reach        NG Tube removed  Tolerated  Clear liquid  Tolerated GI Soft diet  Daily weight  RA  AFIB  Denies pain  +voiding   +flatus  Discharging home   FWW

## 2019-11-09 NOTE — DISCHARGE SUMMARY
Discharge Summary    CHIEF COMPLAINT ON ADMISSION  Chief Complaint   Patient presents with   • Sent by MD   • Abdominal Pain       Reason for Admission  ems      Admission Date  11/7/2019    CODE STATUS  Full Code    HPI & HOSPITAL COURSE  Patient is a 74-year-old male with history of AF not on anticoagulation due to hx of GI bleed w anemia, CHF with EF of 25%, mild to moderate valvular disease, pulmonary hypertension, HLD, HTN who presented with abdominal pain.  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  The patient has history of multiple abdominal surgeries including ALIF and hernia repair.     General surgery consultation was obtained and the patient received an NG tube to low continuous suction.  He quickly improved and has deferred surgery at present despite the risks.  He was observed overnight.  His bowel sounds were normal and his abdomen was soft and nondistended nontender.  He was reevaluated by general surgery who has removed his NG tube.  He was started on a clear liquid diet with great tolerance.  This was quickly advanced to a cardiac meal and the patient tolerated this well without any complaints.    His diuretics and blood pressure medications were held due to n.p.o. status and he received gentle IV fluid hydration.  He can resume his home heart failure medications as his blood pressure allows and follow-up with Dr. Cason and/or a general surgeon of his preference for further surgical consultation.  Should he have recurrence of his symptoms he will need to call 911 return to emergency room immediately.  Patient is well aware of his clinical condition and is otherwise safe for discharge.    Therefore, he is discharged in good and stable condition to home with close outpatient follow-up.    Discharge Date  11/9/2019    FOLLOW UP ITEMS POST DISCHARGE  PCP follow-up  General surgery follow-up      DISCHARGE DIAGNOSES  Principal Problem (Resolved):    Small bowel obstruction  (HCC) POA: Yes  Active Problems:    Essential hypertension POA: Yes    Hyperlipidemia POA: Yes    Dilated cardiomyopathy (HCC) POA: Yes    Atrial fibrillation (HCC) POA: Unknown    Morgagni hernia POA: Unknown    Elevated BUN POA: Yes    Hyponatremia POA: Unknown    Anemia POA: Unknown    Thrombocytopenia (HCC) POA: Unknown      FOLLOW UP  No future appointments.  Zi Valdez M.D.  [id number]  [place] [address]  [phone]    Schedule an appointment as soon as possible for a visit in 1 week  Hospital follow-up appointment with PCP    Ammon Cason M.D.  [address] [facility]  [address]  [place] [id number] [phone]    Schedule an appointment as soon as possible for a visit  hernia follow up      MEDICATIONS ON DISCHARGE     Medication List      CONTINUE taking these medications      Instructions   CALCIUM + D PO   Take 1 Cap by mouth every day.  Dose:  1 Cap     Calcium-Vitamin D 600-400 MG-UNIT Tabs   Take 1 Tab by mouth every day.  Dose:  1 Tab     carvedilol 3.125 MG Tabs  Commonly known as:  COREG   Take 3.125 mg by mouth every day.  Dose:  3.125 mg     docusate sodium 100 MG Caps  Commonly known as:  COLACE   Take 100 mg by mouth 3 times a day.  Dose:  100 mg     doxycycline 100 MG Tabs  Commonly known as:  VIBRAMYCIN   Take 100 mg by mouth 2 times a day.  Dose:  100 mg     FEOSOL PO   Take 1 Tab by mouth every day.  Dose:  1 Tab     furosemide 20 MG Tabs  Commonly known as:  LASIX   Take 20 mg by mouth every day.  Dose:  20 mg     gabapentin 300 MG Caps  Commonly known as:  NEURONTIN   Take 600 mg by mouth 3 times a day.  Dose:  600 mg     losartan 25 MG Tabs  Commonly known as:  COZAAR   Take 25 mg by mouth 2 Times a Day.  Dose:  25 mg     Pain Pump Betzy  Commonly known as:  patient supplied   by Injection route Continuous. Patient's Pain Pump (placed and maintained as an outpatient)  Medications/concentrations:   Hydromorphone 12 mg/ml  Bupivacaine 20 mg/ml    Route: Intrathecal  Date of  Placement: Sep 2015  Last changed: 8/12/19    Continuous infusion rates (Drug/Rate):  Hydromorphone 2.991 mg/day  Bupivacaine 4.985 mg/day    Patient activation dose:   Hydromorphone 0.200mg  3.766 mg/day  Bupivacaine 0.333 mg  6.277 mg/day  Patient activation lockout interval: 1hr  Maximum activations per day: 4     pantoprazole 40 MG Tbec  Commonly known as:  PROTONIX   Take 40 mg by mouth 2 Times a Day.  Dose:  40 mg     polyethylene glycol/lytes Pack  Commonly known as:  MIRALAX   Take 17 g by mouth every day.  Dose:  17 g     PRAVACHOL 40 MG tablet  Generic drug:  pravastatin   Take 40 mg by mouth every evening.  Dose:  40 mg     PROBIOTIC PO   Take 1 Cap by mouth every day.  Dose:  1 Cap     spironolactone 25 MG Tabs  Commonly known as:  ALDACTONE   Take 25 mg by mouth 2 Times a Day.  Dose:  25 mg            Allergies  No Known Allergies    DIET  Orders Placed This Encounter   Procedures   • Diet Order Low Fiber(GI Soft)     Standing Status:   Standing     Number of Occurrences:   1     Order Specific Question:   Diet:     Answer:   Low Fiber(GI Soft) [2]       ACTIVITY  As tolerated.  Weight bearing as tolerated    CONSULTATIONS  General surgery    LABORATORY  Lab Results   Component Value Date    SODIUM 139 11/09/2019    POTASSIUM 4.1 11/09/2019    CHLORIDE 103 11/09/2019    CO2 25 11/09/2019    GLUCOSE 89 11/09/2019    BUN 24 (H) 11/09/2019    CREATININE 0.96 11/09/2019        Lab Results   Component Value Date    WBC 5.6 11/09/2019    HEMOGLOBIN 13.2 (L) 11/09/2019    HEMATOCRIT 41.6 (L) 11/09/2019    PLATELETCT 170 11/09/2019        Total time of the discharge process exceeds 40 minutes.

## 2019-11-11 ENCOUNTER — APPOINTMENT (OUTPATIENT)
Dept: RADIOLOGY | Facility: MEDICAL CENTER | Age: 74
DRG: 389 | End: 2019-11-11
Attending: HOSPITALIST
Payer: MEDICARE

## 2019-11-11 ENCOUNTER — HOSPITAL ENCOUNTER (INPATIENT)
Facility: MEDICAL CENTER | Age: 74
LOS: 2 days | DRG: 389 | End: 2019-11-13
Attending: HOSPITALIST | Admitting: HOSPITALIST
Payer: MEDICARE

## 2019-11-11 ENCOUNTER — HOSPITAL ENCOUNTER (OUTPATIENT)
Dept: RADIOLOGY | Facility: MEDICAL CENTER | Age: 74
End: 2019-11-11

## 2019-11-11 ENCOUNTER — HOSPITAL ENCOUNTER (OUTPATIENT)
Facility: MEDICAL CENTER | Age: 74
DRG: 389 | End: 2019-11-11
Admitting: HOSPITALIST
Payer: MEDICARE

## 2019-11-11 PROBLEM — K56.609 SBO (SMALL BOWEL OBSTRUCTION) (HCC): Status: ACTIVE | Noted: 2019-11-11

## 2019-11-11 PROBLEM — I50.20 SYSTOLIC CHF (HCC): Status: ACTIVE | Noted: 2019-11-11

## 2019-11-11 PROCEDURE — 700105 HCHG RX REV CODE 258: Performed by: HOSPITALIST

## 2019-11-11 PROCEDURE — 770006 HCHG ROOM/CARE - MED/SURG/GYN SEMI*

## 2019-11-11 PROCEDURE — 700101 HCHG RX REV CODE 250: Performed by: HOSPITALIST

## 2019-11-11 PROCEDURE — 700111 HCHG RX REV CODE 636 W/ 250 OVERRIDE (IP): Performed by: HOSPITALIST

## 2019-11-11 PROCEDURE — 99223 1ST HOSP IP/OBS HIGH 75: CPT | Performed by: HOSPITALIST

## 2019-11-11 PROCEDURE — 99358 PROLONG SERVICE W/O CONTACT: CPT | Performed by: HOSPITALIST

## 2019-11-11 RX ORDER — HEPARIN SODIUM 5000 [USP'U]/ML
5000 INJECTION, SOLUTION INTRAVENOUS; SUBCUTANEOUS EVERY 8 HOURS
Status: DISCONTINUED | OUTPATIENT
Start: 2019-11-11 | End: 2019-11-13 | Stop reason: HOSPADM

## 2019-11-11 RX ORDER — ONDANSETRON 2 MG/ML
4 INJECTION INTRAMUSCULAR; INTRAVENOUS EVERY 4 HOURS PRN
Status: DISCONTINUED | OUTPATIENT
Start: 2019-11-11 | End: 2019-11-13 | Stop reason: HOSPADM

## 2019-11-11 RX ORDER — LABETALOL HYDROCHLORIDE 5 MG/ML
10 INJECTION, SOLUTION INTRAVENOUS EVERY 4 HOURS PRN
Status: DISCONTINUED | OUTPATIENT
Start: 2019-11-11 | End: 2019-11-13 | Stop reason: HOSPADM

## 2019-11-11 RX ORDER — MORPHINE SULFATE 4 MG/ML
1-2 INJECTION, SOLUTION INTRAMUSCULAR; INTRAVENOUS EVERY 4 HOURS PRN
Status: DISCONTINUED | OUTPATIENT
Start: 2019-11-11 | End: 2019-11-13 | Stop reason: HOSPADM

## 2019-11-11 RX ORDER — SODIUM CHLORIDE, SODIUM LACTATE, POTASSIUM CHLORIDE, CALCIUM CHLORIDE 600; 310; 30; 20 MG/100ML; MG/100ML; MG/100ML; MG/100ML
INJECTION, SOLUTION INTRAVENOUS CONTINUOUS
Status: DISCONTINUED | OUTPATIENT
Start: 2019-11-11 | End: 2019-11-13

## 2019-11-11 RX ORDER — FUROSEMIDE 10 MG/ML
20 INJECTION INTRAMUSCULAR; INTRAVENOUS
Status: DISCONTINUED | OUTPATIENT
Start: 2019-11-11 | End: 2019-11-12

## 2019-11-11 RX ADMIN — HEPARIN SODIUM 5000 UNITS: 5000 INJECTION, SOLUTION INTRAVENOUS; SUBCUTANEOUS at 21:24

## 2019-11-11 RX ADMIN — SODIUM CHLORIDE, POTASSIUM CHLORIDE, SODIUM LACTATE AND CALCIUM CHLORIDE: 600; 310; 30; 20 INJECTION, SOLUTION INTRAVENOUS at 04:17

## 2019-11-11 RX ADMIN — HEPARIN SODIUM 5000 UNITS: 5000 INJECTION, SOLUTION INTRAVENOUS; SUBCUTANEOUS at 05:08

## 2019-11-11 RX ADMIN — FUROSEMIDE 20 MG: 10 INJECTION, SOLUTION INTRAMUSCULAR; INTRAVENOUS at 05:08

## 2019-11-11 RX ADMIN — LABETALOL HYDROCHLORIDE 10 MG: 5 INJECTION INTRAVENOUS at 17:25

## 2019-11-11 RX ADMIN — SODIUM CHLORIDE, POTASSIUM CHLORIDE, SODIUM LACTATE AND CALCIUM CHLORIDE: 600; 310; 30; 20 INJECTION, SOLUTION INTRAVENOUS at 23:44

## 2019-11-11 RX ADMIN — HEPARIN SODIUM 5000 UNITS: 5000 INJECTION, SOLUTION INTRAVENOUS; SUBCUTANEOUS at 14:08

## 2019-11-11 RX ADMIN — SODIUM CHLORIDE, POTASSIUM CHLORIDE, SODIUM LACTATE AND CALCIUM CHLORIDE: 600; 310; 30; 20 INJECTION, SOLUTION INTRAVENOUS at 14:08

## 2019-11-11 ASSESSMENT — ENCOUNTER SYMPTOMS
NAUSEA: 0
TINGLING: 0
MYALGIAS: 0
DEPRESSION: 0
COUGH: 0
HEADACHES: 0
CHILLS: 0
VOMITING: 0
SHORTNESS OF BREATH: 0
DIZZINESS: 0
WHEEZING: 0
FOCAL WEAKNESS: 0
FEVER: 0
SORE THROAT: 0
PHOTOPHOBIA: 0
ABDOMINAL PAIN: 1
DIARRHEA: 0
PALPITATIONS: 0

## 2019-11-11 ASSESSMENT — COGNITIVE AND FUNCTIONAL STATUS - GENERAL
STANDING UP FROM CHAIR USING ARMS: A LITTLE
PERSONAL GROOMING: A LITTLE
DAILY ACTIVITIY SCORE: 19
HELP NEEDED FOR BATHING: A LITTLE
WALKING IN HOSPITAL ROOM: A LITTLE
TOILETING: A LITTLE
MOBILITY SCORE: 21
CLIMB 3 TO 5 STEPS WITH RAILING: A LITTLE
SUGGESTED CMS G CODE MODIFIER DAILY ACTIVITY: CK
DRESSING REGULAR UPPER BODY CLOTHING: A LITTLE
DRESSING REGULAR LOWER BODY CLOTHING: A LITTLE
SUGGESTED CMS G CODE MODIFIER MOBILITY: CJ

## 2019-11-11 ASSESSMENT — PATIENT HEALTH QUESTIONNAIRE - PHQ9
2. FEELING DOWN, DEPRESSED, IRRITABLE, OR HOPELESS: NOT AT ALL
1. LITTLE INTEREST OR PLEASURE IN DOING THINGS: NOT AT ALL
SUM OF ALL RESPONSES TO PHQ9 QUESTIONS 1 AND 2: 0

## 2019-11-11 ASSESSMENT — LIFESTYLE VARIABLES
CONSUMPTION TOTAL: NEGATIVE
EVER HAD A DRINK FIRST THING IN THE MORNING TO STEADY YOUR NERVES TO GET RID OF A HANGOVER: NO
EVER_SMOKED: YES
ON A TYPICAL DAY WHEN YOU DRINK ALCOHOL HOW MANY DRINKS DO YOU HAVE: 2
ALCOHOL_USE: YES
EVER FELT BAD OR GUILTY ABOUT YOUR DRINKING: NO
TOTAL SCORE: 0
HOW MANY TIMES IN THE PAST YEAR HAVE YOU HAD 5 OR MORE DRINKS IN A DAY: 0
TOTAL SCORE: 0
TOTAL SCORE: 0
HAVE YOU EVER FELT YOU SHOULD CUT DOWN ON YOUR DRINKING: NO
AVERAGE NUMBER OF DAYS PER WEEK YOU HAVE A DRINK CONTAINING ALCOHOL: 7
HAVE PEOPLE ANNOYED YOU BY CRITICIZING YOUR DRINKING: NO

## 2019-11-11 NOTE — H&P
Hospital Medicine History & Physical Note    Date of Service  11/11/2019    Primary Care Physician  Zi Valdez M.D.    Consultants  Dr. Caceres, surgery    Code Status  Full    Chief Complaint  Transfer from outside facility for recurrent partial small bowel obstruction    History of Presenting Illness  74 y.o. male who presented on 11/11/2019 transfer from outside facility for partial small bowel obstruction.  This is a 74-year-old gentleman with a history of diaphragmatic hernia who was discharged from our facility on November 9 after an admission for small bowel obstruction.  He is followed by surgery and had an NG tube placed with gradual improvement utilizing conservative treatment.  On the date of discharge, his diet was advanced to a solid cardiac diet which he tolerated.  He had been referred to Dr. Handley for follow-up on Tuesday.  Patient states that after going home, he continued to have a solid diet and was well.  However by the following evening around 10 PM, he had return of his abdominal pain.  This was described as epigastric, cramping, nonradiating, and similar to his pain with his prior hospitalization.  Interestingly he had no nausea or vomiting.  He did not have a bowel movement at home but continued to pass flatus.  He denies any alleviating factors for his pain.    He presented to the outside facility with these complaints and based on work-up, they placed an NG tube.  Patient states that since the NG tube was placed, he has felt improved.  Additional work-up at the outside facility including WBC 6.9 hemoglobin 13.6 hematocrit 41.9 platelet 176 sodium 139 potassium 4 0.0 chloride 101 CO2 29 BUN 25 creatinine 1.3 and glucose 88.  CT of the abdomen and pelvis showed a partial small bowel obstruction less severe than previous imaging.  General surgery was consulted by the outside facility and recommended transfer to our facility for higher level of care.    Review of Systems  Review of Systems    Constitutional: Negative for chills and fever.   HENT: Negative for congestion and sore throat.    Eyes: Negative for photophobia.   Respiratory: Negative for cough, shortness of breath and wheezing.    Cardiovascular: Negative for chest pain and palpitations.   Gastrointestinal: Positive for abdominal pain. Negative for diarrhea, nausea and vomiting.   Genitourinary: Negative for dysuria.   Musculoskeletal: Negative for myalgias.   Skin: Negative.    Neurological: Negative for dizziness, tingling, focal weakness and headaches.   Psychiatric/Behavioral: Negative for depression and suicidal ideas.       Past Medical History  Past Medical History:   Diagnosis Date   • Arthritis    • Atrial fibrillation (HCC)    • Blood transfusion without reported diagnosis    • Cataract    • Congestive heart failure with left ventricular systolic dysfunction (HCC)     EF 25%   • Hepatitis A    • History of blood clots    • Hyperlipidemia    • Hypertension    • OA (osteoarthritis) of neck        Surgical History  Past Surgical History:   Procedure Laterality Date   • GASTROSCOPY N/A 5/14/2019    Procedure: GASTROSCOPY;  Surgeon: Zi Fam M.D.;  Location: [facility];  Service: General   • GASTROSCOPY N/A 2/22/2019    Procedure: GASTROSCOPY;  Surgeon: Zi Fam M.D.;  Location: [facility];  Service: General   • COLONOSCOPY N/A 2/22/2019    Procedure: COLONOSCOPY;  Surgeon: Zi Fam M.D.;  Location: [facility];  Service: General   • CARPAL TUNNEL RELEASE     • KNEE REVISION TOTAL     • OTHER      Pain Pump   • OTHER      metal removed from back   • PB SPINAL FUSION,ANT,EA ADNL LEVEL      spinal fusion lower back   • TRIGGER FINGER RELEASE     • ZZZ IVC FILTER-CATH LAB      prior DVT       Family History  Family History   Problem Relation Age of Onset   • Heart Disease Father    • Heart Attack Father    • Lung Disease Father        Social History  Social History     Tobacco Use    • Smoking status: Former Smoker   • Smokeless tobacco: Never Used   Substance Use Topics   • Alcohol use: No     Alcohol/week: 0.0 oz   • Drug use: No       Allergies  No Known Allergies    Medications  No current facility-administered medications on file prior to encounter.      Current Outpatient Medications on File Prior to Encounter   Medication Sig Dispense Refill   • Calcium Carb-Cholecalciferol (CALCIUM-VITAMIN D) 600-400 MG-UNIT Tab Take 1 Tab by mouth every day.     • carvedilol (COREG) 3.125 MG Tab Take 3.125 mg by mouth every day.     • furosemide (LASIX) 20 MG Tab Take 20 mg by mouth every day.     • gabapentin (NEURONTIN) 300 MG Cap Take 600 mg by mouth 3 times a day.     • losartan (COZAAR) 25 MG Tab Take 25 mg by mouth 2 Times a Day.     • pantoprazole (PROTONIX) 40 MG Tablet Delayed Response Take 40 mg by mouth 2 Times a Day.     • polyethylene glycol/lytes (MIRALAX) Pack Take 17 g by mouth every day.     • pravastatin (PRAVACHOL) 40 MG tablet Take 40 mg by mouth every evening.     • spironolactone (ALDACTONE) 25 MG Tab Take 25 mg by mouth 2 Times a Day.     • docusate sodium (COLACE) 100 MG Cap Take 100 mg by mouth 3 times a day.     • Probiotic Product (PROBIOTIC PO) Take 1 Cap by mouth every day.     • Ferrous Sulfate (FEOSOL PO) Take 1 Tab by mouth every day.     • Pain Pump (PATIENT SUPPLIED) XX PARVIZ by Injection route Continuous. Patient's Pain Pump (placed and maintained as an outpatient)  Medications/concentrations:   Hydromorphone 12 mg/ml  Bupivacaine 20 mg/ml    Route: Intrathecal  Date of Placement: Sep 2015  Last changed: 8/12/19    Continuous infusion rates (Drug/Rate):  Hydromorphone 2.991 mg/day  Bupivacaine 4.985 mg/day    Patient activation dose:   Hydromorphone 0.200mg  3.766 mg/day  Bupivacaine 0.333 mg  6.277 mg/day  Patient activation lockout interval: 1hr  Maximum activations per day: 4     • doxycycline (VIBRAMYCIN) 100 MG Tab Take 100 mg by mouth 2 times a day.     •  Calcium Citrate-Vitamin D (CALCIUM + D PO) Take 1 Cap by mouth every day.         Physical Exam  Hemodynamics  Temp (24hrs), Av.9 °C (98.5 °F), Min:36.9 °C (98.5 °F), Max:36.9 °C (98.5 °F)   Temperature: 36.9 °C (98.5 °F)  Pulse  Av.9  Min: 53  Max: 94    Blood Pressure : 124/77      Respiratory      Respiration: 18, Pulse Oximetry: 96 %             Physical Exam   Constitutional: He is oriented to person, place, and time. No distress.   HENT:   Head: Normocephalic and atraumatic.   Right Ear: External ear normal.   Left Ear: External ear normal.   Eyes: EOM are normal. Right eye exhibits no discharge. Left eye exhibits no discharge.   Neck: Neck supple. No JVD present.   Cardiovascular: Normal rate, regular rhythm and normal heart sounds.   Pulmonary/Chest: Effort normal and breath sounds normal. No respiratory distress. He exhibits no tenderness.   Abdominal: Soft. He exhibits no distension. There is no tenderness.   Hypoactive bowel sounds   Musculoskeletal:         General: No edema.   Neurological: He is alert and oriented to person, place, and time. No cranial nerve deficit.   Skin: Skin is dry. He is not diaphoretic. No erythema.   Psychiatric: He has a normal mood and affect. His behavior is normal.   Nursing note and vitals reviewed.    Capillary refill less than 3 seconds, distal pulses intact    Laboratory:  Recent Labs     19  0443 19  0402 11/10/19  2237   WBC 5.3 5.6 6.9   RBC 3.88* 4.26* 4.36*   HEMOGLOBIN 12.0* 13.2* 13.6*   HEMATOCRIT 37.5* 41.6* 41.9*   MCV 96.6 97.7 96.1*   MCH 30.9 31.0 31.2*   MCHC 32.0* 31.7* 32.5*   RDW 49.6 49.4 13.6   PLATELETCT 161* 170 176   MPV 9.9 9.8 9.6     Recent Labs     19  0443 19  0402 11/10/19  2237   SODIUM 136 139 139   POTASSIUM 4.0 4.1 4.0   CHLORIDE 100 103 101   CO2 30 25 29   GLUCOSE 93 89 88   BUN 33* 24* 25*   CREATININE 1.07 0.96 1.3   CALCIUM 9.6 10.1 9.4     Recent Labs     19  0443 19  0402 11/10/19  2847    ALTSGPT 16  --  25   ASTSGOT 18  --  22   ALKPHOSPHAT 108*  --  150*   TBILIRUBIN 1.2  --  0.9   LIPASE  --   --  108   GLUCOSE 93 89 88                 Lab Results   Component Value Date    TROPONINI 0.03 02/21/2019       Imaging  Ct Abdomen-pelvis With Iv Contrast    Result Date: 11/7/2019  HISTORY/REASON FOR EXAM:  Abd pain, unspecified; RLQ TECHNIQUE/EXAM DESCRIPTION: CT scan of the abdomen and pelvis with contrast.  Both automated exposure control and Automated adjustment of tube current based on patient size are utilized.  11/7/2019 2:02 PM. Total DLP: 908 mGy*cm COMPARISON: None. FINDINGS: Lung bases:  There are bilateral lower lobe known consolidated infiltrates identified. Both infection and atelectasis needed to be considered. The heart is enlarged. There is no pericardial effusion. There is coronary atherosclerosis. There is a small hiatal hernia. 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 This is a diaphragmatic hernia does contain loops of small bowel. The small bowel loops within this hernia are decompressed. However, there are multiple loops of dilated fluid-filled small bowel throughout the remainder of the abdomen. I believe that the bowel loops within the more directly hernia are probably distal ileum and therefore this is likely the cause of the obstruction. Liver:  Normal. Gallbladder:  Normal. Spleen:  Normal. Adrenal glands:  Normal. Pancreas:  Normal. Kidneys:  There are numerous small bilateral low-attenuation lesions most compatible with multiple small cysts. There is an IVC filter in place. Appendix:  Not visualized. There is severe uncomplicated diverticulosis. Pelvis:  CT of pelvis demonstrates no evidence for abnormal mass or fluid collection. Adenopathy: There is no evidence for pathologically enlarged retroperitoneal or mesenteric adenopathy. There is an IVC filter in place. It is tilted approximately 45 degree angle to the right. 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  There is no perianeurysmal hemorrhage.     1.  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 Emergent general surgical consultation recommended. Findings were reviewed by Dr. reilly bhakta. 2.  2 cm left common iliac artery aneurysm. 3.  Cardiomegaly. 4.  Coronary atherosclerosis. 5.  Bilateral areas of infiltrate. Considerable infection and atelectasis. 6.  Severe uncomplicated diverticulosis. Isma Shelton MD 11/7/2019 2:57 PM DLP Reporting Thresholds for Incorrect/Repeated Exams - DLP in mGy*cm Head/Neck:  0-year-old 3840, 1-year-old 5880, 5-year-old 8770, 10-year-old 70565 and adult 05311 Head:  0-year-old 4540, 1-year-old 7460, 5-year-old 72676, 10-year-old 73416 and adult 19262 Neck:  0-year-old 2940, 1-year-old 4160, 5-year-old 4550, 10-year-old 6320 and adult 8470 Chest:  0-year-old 550, 1-year-old 830, 5-year-old 1200, 10-year-old 3840 and adult 3570 Abd/pelvis:  0-year-old 440, 1-year-old 720, 5-year-old 1080, 10-year-old 3330 and adult 3330 Trunk(C/A/P):  0-year-old 490, 1-year-old 770, 5-year-old 1140, 10-year-old 3570 and adult 3330    Dx-abdomen For Tube Placement    Result Date: 11/7/2019 11/7/2019 8:29 PM HISTORY/REASON FOR EXAM: NG TECHNIQUE/EXAM DESCRIPTION:  Single AP view the abdomen. COMPARISON:  None FINDINGS: Hazy bilateral lower lobe opacities are seen. Nasogastric tube is seen, the tip lies to the right of the lumbar spine.  Scattered air-filled reactive loops of small bowel are seen within the abdomen. The bony structures appear age-appropriate. Postsurgical changes of spinal fusion are seen. There is fracture of right Calvo omar seen in 2 locations. Inferior vena cava filter is noted at approximately L3.     1.  Air-filled distended loops of bowel are seen with reactive mucosal pattern, appearance suggests ileus or enteritis. Recommend radiographic  followup to resolution to exclude progression to obstruction. 2.  Nasogastric tube tip terminates overlying the expected location of the gastric body. 3.  Right Calvo omar is fractured in 2 locations.    Dx-abdomen For Tube Placement    Result Date: 11/7/2019 11/7/2019 8:17 PM HISTORY/REASON FOR EXAM: NG tube TECHNIQUE/EXAM DESCRIPTION:  Single AP view the abdomen. COMPARISON:  None FINDINGS: Hazy bilateral lower lobe opacities are seen. Dobbhoff tube is seen, the tip terminates at the gastroesophageal junction.  Scattered air-filled reactive loops of small bowel are seen within the abdomen. The bony structures appear age-appropriate. Postsurgical changes of spinal fusion are seen. There is fracture of right Calvo omar seen in 2 locations.     1.  Air-filled distended loops of bowel are seen with reactive mucosal pattern, appearance suggests ileus or enteritis. Recommend radiographic followup to resolution to exclude progression to obstruction. 2.  Nasogastric tube tip terminates overlying the expected location of the gastroesophageal junction, recommend advancement. 3.  Bilateral lower lobe infiltrates. 4.  Right Clavo omar appears fractured in 2 locations.        Assessment/Plan:  Anticipate that patient will need greater than 2 midnights for management of the discussed medical issues.    * SBO (small bowel obstruction) (HCC)  Assessment & Plan  CT scan at the outside facility shows continued partial small bowel obstruction but by radiologist read this appears to be less severe than previous imaging.  Nevertheless, the patient's symptoms have returned and has now begun to feel improved with NG tube placement.  He will be admitted to the surgical floor and continued on NG tube to intermittent low wall suction, I will make her strictly n.p.o. for bowel rest and start him on IV fluid hydration.  The patient has chronic pain syndrome and utilizes a pain pump, he can continue this on an inpatient setting  and if this is not sufficient for pain coverage, then we will consider additional breakthrough medications.  He will also receive symptomatic management for nausea.  Dr. Caceres was consulted by the outside facility, we will notify her of this patient's arrival.    Systolic CHF (HCC)  Assessment & Plan  Patient carries a systolic ejection fraction of 25%, he will be on maintenance fluids because of his strict n.p.o. status for treatment of partial small bowel obstruction, however given his known baseline systolic dysfunction, I will start him on IV Lasix to avoid volume overload.  Otherwise, plan to restart home spironolactone and Lasix once cleared by surgery for oral medications.    Atrial fibrillation (HCC)- (present on admission)  Assessment & Plan  Heart rate by auscultation and palpation was regular and rate controlled.  Holding home Coreg in light of n.p.o. status for partial small bowel obstruction.  We will add IV beta-blockade as needed.  Patient is not on anticoagulation.    Hyperlipidemia- (present on admission)  Assessment & Plan  Patient is strictly n.p.o. for partial small bowel obstruction, holding home Pravachol.    Essential hypertension- (present on admission)  Assessment & Plan  Patient will be strictly n.p.o., holding home Cozaar.  Will cover with PRN IV antihypertensives.      Prophylaxis: Heparin for DVT prophylaxis, no PPI indicated, bowel protocol as needed    I spent a total of 35 minutes of non face to face time performing additional research, reviewing medical records from transferring facility, discussing plan of care with other healthcare providers. Start time: 3:15AM. End time: 3:50AM.

## 2019-11-11 NOTE — PROGRESS NOTES
Patient seen and examined. Questions answered. He is feeling better. Not really hungry yet but is passing gas.

## 2019-11-11 NOTE — ASSESSMENT & PLAN NOTE
CT scan at the outside facility shows continued partial small bowel obstruction but by radiologist read this appears to be less severe than previous imaging.  Nevertheless, the patient's symptoms have returned and has now begun to feel improved with NG tube placement.    11/12: NG tube has been removed.  Patient is tolerating clears.  Advance diet as tolerated.  Ambulate.

## 2019-11-11 NOTE — PROGRESS NOTES
PT seen and examined  Readmited w SBO - recently DCd  Abd benign  NGT in place  Monitor output  Consider SBFT if not improving.

## 2019-11-11 NOTE — PROGRESS NOTES
Patient direct admit from [facility] ED, patient is alert and oriented x4, NG tube in place in R nare, all belongings intact, in no apparent distress.

## 2019-11-11 NOTE — DISCHARGE PLANNING
Care Transition Team Assessment    Information Source  Orientation : Oriented x 4  Information Given By: Patient  Informant's Name: (Jimmie)  Who is responsible for making decisions for patient? : Patient    Readmission Evaluation  Is this a readmission?: No    Elopement Risk  Legal Hold: No  Ambulatory or Self Mobile in Wheelchair: Yes  Disoriented: No  Psychiatric Symptoms: None  History of Wandering: No  Elopement this Admit: No  Vocalizing Wanting to Leave: No  Displays Behaviors, Body Language Wanting to Leave: No-Not at Risk for Elopement    Interdisciplinary Discharge Planning  Patient or legal guardian wants to designate a caregiver (see row info): No    Discharge Preparedness  What is your plan after discharge?: Home with help  What are your discharge supports?: Spouse  Prior Functional Level: Independent with Activities of Daily Living    Functional Assesment  Prior Functional Level: Independent with Activities of Daily Living    Finances  Financial Barriers to Discharge: No  Prescription Coverage: Yes    Vision / Hearing Impairment  Vision Impairment : Yes  Right Eye Vision: Impaired, Wears Glasses  Left Eye Vision: Impaired, Wears Glasses  Hearing Impairment : No         Advance Directive  Advance Directive?: None  Advance Directive offered?: AD Booklet refused    Domestic Abuse  Have you ever been the victim of abuse or violence?: No  Physical Abuse or Sexual Abuse: No  Verbal Abuse or Emotional Abuse: No  Possible Abuse Reported to:: Not Applicable    Psychological Assessment  History of Substance Abuse: None  History of Psychiatric Problems: No    Discharge Risks or Barriers  Discharge risks or barriers?: No    Anticipated Discharge Information  Anticipated discharge disposition: Home

## 2019-11-11 NOTE — PROGRESS NOTES
Patient A&Ox4, VSS on RA.  NG to R nare, scant amount of brown output.  Denies pain and nausea.  Passing gas.  Resting comfortably at this time.  Remains NPO.

## 2019-11-11 NOTE — PROGRESS NOTES
2 RN skin check complete with TIMO Ram.  Devices in place NG tube.  Skin assessed under devices yes.  Confirmed pressure ulcers found on n/a.  New potential pressure ulcers noted on n/a. Wound consult placed no.  The following interventions in place pillows used for positioning and support.    BLE edematous, flaky, and red/dusky/bronze, scar on back from old surgery, heels soft, generalized bruising.

## 2019-11-11 NOTE — PROGRESS NOTES
Direct admit from [facility].  Accepted by Dr. Trevino for Bowel Obstruction / Diaphragmatic Hernia.    Dr. Caceres has accepted patient for consult.  ADT per Dr. Trevino signed & held; needs to be released upon patient arrival.  No written orders received.  Patient coming by ground.  Please page on-call direct admit hospitalist & on-call general surgeon to notify of patient's arrival.

## 2019-11-11 NOTE — CARE PLAN
Problem: Communication  Goal: The ability to communicate needs accurately and effectively will improve  Outcome: PROGRESSING AS EXPECTED     Problem: Safety  Goal: Will remain free from injury  Outcome: PROGRESSING AS EXPECTED  Goal: Will remain free from falls  Outcome: PROGRESSING AS EXPECTED     Problem: Bowel/Gastric:  Goal: Normal bowel function is maintained or improved  Outcome: PROGRESSING AS EXPECTED  Goal: Will not experience complications related to bowel motility  Outcome: PROGRESSING AS EXPECTED  Note:   Patient passing flatus

## 2019-11-11 NOTE — PROGRESS NOTES
74-year-old male past medical history of atrial fibrillation on anticoagulation due to history of GI bleed, CHF with ejection fraction 25% mild to moderate valvular disease, pulmonary hypertension, hyperlipidemia, hyper hypertension was transferred from outside hospital with a known right-sided Morgagni diaphragmatic hernia with associated small bowel obstruction.  Dr. Caceres surgery has been consulted.  NG tube has been placed.

## 2019-11-12 LAB
ANION GAP SERPL CALC-SCNC: 9 MMOL/L (ref 0–11.9)
BUN SERPL-MCNC: 20 MG/DL (ref 8–22)
CALCIUM SERPL-MCNC: 9.5 MG/DL (ref 8.5–10.5)
CHLORIDE SERPL-SCNC: 100 MMOL/L (ref 96–112)
CO2 SERPL-SCNC: 31 MMOL/L (ref 20–33)
CREAT SERPL-MCNC: 0.99 MG/DL (ref 0.5–1.4)
ERYTHROCYTE [DISTWIDTH] IN BLOOD BY AUTOMATED COUNT: 48 FL (ref 35.9–50)
GLUCOSE SERPL-MCNC: 80 MG/DL (ref 65–99)
HCT VFR BLD AUTO: 38.3 % (ref 42–52)
HGB BLD-MCNC: 12.5 G/DL (ref 14–18)
MCH RBC QN AUTO: 31.4 PG (ref 27–33)
MCHC RBC AUTO-ENTMCNC: 32.6 G/DL (ref 33.7–35.3)
MCV RBC AUTO: 96.2 FL (ref 81.4–97.8)
PLATELET # BLD AUTO: 153 K/UL (ref 164–446)
PMV BLD AUTO: 10.1 FL (ref 9–12.9)
POTASSIUM SERPL-SCNC: 3.7 MMOL/L (ref 3.6–5.5)
RBC # BLD AUTO: 3.98 M/UL (ref 4.7–6.1)
SODIUM SERPL-SCNC: 140 MMOL/L (ref 135–145)
WBC # BLD AUTO: 4.9 K/UL (ref 4.8–10.8)

## 2019-11-12 PROCEDURE — 700102 HCHG RX REV CODE 250 W/ 637 OVERRIDE(OP): Performed by: FAMILY MEDICINE

## 2019-11-12 PROCEDURE — 700111 HCHG RX REV CODE 636 W/ 250 OVERRIDE (IP): Performed by: HOSPITALIST

## 2019-11-12 PROCEDURE — 700105 HCHG RX REV CODE 258: Performed by: HOSPITALIST

## 2019-11-12 PROCEDURE — 85027 COMPLETE CBC AUTOMATED: CPT

## 2019-11-12 PROCEDURE — 80048 BASIC METABOLIC PNL TOTAL CA: CPT

## 2019-11-12 PROCEDURE — 770006 HCHG ROOM/CARE - MED/SURG/GYN SEMI*

## 2019-11-12 PROCEDURE — 36415 COLL VENOUS BLD VENIPUNCTURE: CPT

## 2019-11-12 PROCEDURE — A9270 NON-COVERED ITEM OR SERVICE: HCPCS | Performed by: FAMILY MEDICINE

## 2019-11-12 PROCEDURE — 99232 SBSQ HOSP IP/OBS MODERATE 35: CPT | Performed by: FAMILY MEDICINE

## 2019-11-12 RX ORDER — SPIRONOLACTONE 50 MG/1
25 TABLET, FILM COATED ORAL
Status: DISCONTINUED | OUTPATIENT
Start: 2019-11-12 | End: 2019-11-13 | Stop reason: HOSPADM

## 2019-11-12 RX ORDER — PANTOPRAZOLE SODIUM 40 MG/1
40 TABLET, DELAYED RELEASE ORAL 2 TIMES DAILY
Status: DISCONTINUED | OUTPATIENT
Start: 2019-11-12 | End: 2019-11-12

## 2019-11-12 RX ORDER — PRAVASTATIN SODIUM 20 MG
40 TABLET ORAL NIGHTLY
Status: DISCONTINUED | OUTPATIENT
Start: 2019-11-12 | End: 2019-11-13 | Stop reason: HOSPADM

## 2019-11-12 RX ORDER — GABAPENTIN 300 MG/1
600 CAPSULE ORAL 3 TIMES DAILY
Status: DISCONTINUED | OUTPATIENT
Start: 2019-11-12 | End: 2019-11-13 | Stop reason: HOSPADM

## 2019-11-12 RX ORDER — OMEPRAZOLE 20 MG/1
20 CAPSULE, DELAYED RELEASE ORAL 2 TIMES DAILY
Status: DISCONTINUED | OUTPATIENT
Start: 2019-11-12 | End: 2019-11-13 | Stop reason: HOSPADM

## 2019-11-12 RX ORDER — LOSARTAN POTASSIUM 50 MG/1
25 TABLET ORAL 2 TIMES DAILY
Status: DISCONTINUED | OUTPATIENT
Start: 2019-11-12 | End: 2019-11-13 | Stop reason: HOSPADM

## 2019-11-12 RX ORDER — FERROUS SULFATE 325(65) MG
325 TABLET ORAL DAILY
Status: DISCONTINUED | OUTPATIENT
Start: 2019-11-12 | End: 2019-11-13 | Stop reason: HOSPADM

## 2019-11-12 RX ORDER — CARVEDILOL 6.25 MG/1
1.56 TABLET ORAL 2 TIMES DAILY WITH MEALS
Status: DISCONTINUED | OUTPATIENT
Start: 2019-11-12 | End: 2019-11-13 | Stop reason: HOSPADM

## 2019-11-12 RX ORDER — FUROSEMIDE 20 MG/1
20 TABLET ORAL DAILY
Status: DISCONTINUED | OUTPATIENT
Start: 2019-11-12 | End: 2019-11-13 | Stop reason: HOSPADM

## 2019-11-12 RX ADMIN — LOSARTAN POTASSIUM 25 MG: 50 TABLET, FILM COATED ORAL at 17:21

## 2019-11-12 RX ADMIN — SPIRONOLACTONE 25 MG: 50 TABLET ORAL at 17:20

## 2019-11-12 RX ADMIN — HEPARIN SODIUM 5000 UNITS: 5000 INJECTION, SOLUTION INTRAVENOUS; SUBCUTANEOUS at 21:35

## 2019-11-12 RX ADMIN — FUROSEMIDE 20 MG: 10 INJECTION, SOLUTION INTRAMUSCULAR; INTRAVENOUS at 05:36

## 2019-11-12 RX ADMIN — HEPARIN SODIUM 5000 UNITS: 5000 INJECTION, SOLUTION INTRAVENOUS; SUBCUTANEOUS at 13:58

## 2019-11-12 RX ADMIN — HEPARIN SODIUM 5000 UNITS: 5000 INJECTION, SOLUTION INTRAVENOUS; SUBCUTANEOUS at 05:35

## 2019-11-12 RX ADMIN — GABAPENTIN 600 MG: 300 CAPSULE ORAL at 17:18

## 2019-11-12 RX ADMIN — CARVEDILOL 1.56 MG: 6.25 TABLET, FILM COATED ORAL at 17:19

## 2019-11-12 RX ADMIN — PRAVASTATIN SODIUM 40 MG: 20 TABLET ORAL at 21:35

## 2019-11-12 RX ADMIN — FERROUS SULFATE TAB 325 MG (65 MG ELEMENTAL FE) 325 MG: 325 (65 FE) TAB at 17:21

## 2019-11-12 RX ADMIN — SODIUM CHLORIDE, POTASSIUM CHLORIDE, SODIUM LACTATE AND CALCIUM CHLORIDE: 600; 310; 30; 20 INJECTION, SOLUTION INTRAVENOUS at 05:36

## 2019-11-12 RX ADMIN — OMEPRAZOLE 20 MG: 20 CAPSULE, DELAYED RELEASE ORAL at 17:18

## 2019-11-12 ASSESSMENT — ENCOUNTER SYMPTOMS
DEPRESSION: 0
BLURRED VISION: 0
VOMITING: 1
FEVER: 0
BACK PAIN: 0
NAUSEA: 1
CONSTITUTIONAL NEGATIVE: 1
COUGH: 0
NECK PAIN: 0
CARDIOVASCULAR NEGATIVE: 1
SPUTUM PRODUCTION: 0
VOMITING: 0
ORTHOPNEA: 0
CHILLS: 0
HEADACHES: 0
BRUISES/BLEEDS EASILY: 0
RESPIRATORY NEGATIVE: 1
DOUBLE VISION: 0
ABDOMINAL PAIN: 1
PHOTOPHOBIA: 0
DIZZINESS: 0
ABDOMINAL PAIN: 0
PALPITATIONS: 0
NAUSEA: 0
HEMOPTYSIS: 0
TINGLING: 0

## 2019-11-12 NOTE — CONSULTS
DATE OF SERVICE:  11/11/2019    SURGICAL CONSULTATION    REASON FOR CONSULTATION:  The patient is a 74-year-old gentleman who was   readmitted recently with a small-bowel obstruction.  He has a history in the   remote past of having abdominal surgery for hernias and had recently been   admitted for a bowel obstruction.  He was just discharged and had symptoms   return immediately.  I have been asked to see him in regards to this.  His CT   scan shows what appears to be a partial small-bowel obstruction.    PAST MEDICAL HISTORY:  Illnesses are atrial fibrillation, congestive heart   failure, and hypertension.    PAST SURGICAL HISTORY:  Knee arthroplasty, spinal fusion, trigger finger, and   hernia repairs.    CURRENT MEDICATIONS:  Losartan, Coreg, Lasix, Neurontin, Aldactone, and iron.    ALLERGIES:  None.    SOCIAL HISTORY:  He is a former smoker.  He does not drink.    REVIEW OF SYSTEMS:  Otherwise, unremarkable.    PHYSICAL EXAMINATION:  VITAL SIGNS:  He is afebrile.  HEENT:  He is anicteric.  NECK:  Supple.  ABDOMEN:  Soft and he feels better with an NG tube in.  He has no peritoneal   signs.  EXTREMITIES:  Without edema.  NEUROLOGIC:  Intact.    LABORATORY DATA:  White count is 4000, hemoglobin is 12.  Electrolytes are   normal.    IMAGING:  CT as noted above.    IMPRESSION:  A 74-year-old male with what appears to be a partial small-bowel   obstruction.    PLAN:  Plan will be admission and we will plan on monitoring him with serial   exams.  Hopefully, his symptoms improve with nonoperative management.  If they   do not, we may need to consider doing upper GI small bowel followthrough with   surgical intervention.  He understands the plan.  I will follow along.       ____________________________________     MD HENRY ROACH / JEANNIE    DD:  11/12/2019 09:26:47  DT:  11/12/2019 11:02:26    D#:  [id number]  Job#:  [id number]

## 2019-11-12 NOTE — PROGRESS NOTES
Trauma / Surgical Daily Progress Note    Date of Service  11/12/2019    Chief Complaint  74 y.o. male admitted 11/11/2019 with Diaphragmatic Hernia    Interval Events  NG tube fell out.  Pt passing flatus.   Will leave NG out and start clears at noon if pt cont to pass flatus/stool.  Ambulate    Review of Systems  Review of Systems   Constitutional: Negative.    Respiratory: Negative.    Cardiovascular: Negative.    Gastrointestinal: Negative for abdominal pain, nausea and vomiting.   Genitourinary: Negative for dysuria.   Skin: Negative.         Vital Signs  Temp:  [36.9 °C (98.4 °F)-37.1 °C (98.7 °F)] 37 °C (98.6 °F)  Pulse:  [62-80] 80  Resp:  [17-18] 17  BP: (126-143)/(79-99) 142/96  SpO2:  [91 %-99 %] 91 %    Physical Exam  Physical Exam  Cardiovascular:      Rate and Rhythm: Normal rate.      Pulses: Normal pulses.   Pulmonary:      Effort: Pulmonary effort is normal.   Abdominal:      General: Abdomen is flat. There is no distension.      Palpations: Abdomen is soft.   Skin:     General: Skin is warm and dry.   Neurological:      Mental Status: He is alert.         Laboratory  Recent Results (from the past 24 hour(s))   CBC without Differential    Collection Time: 11/12/19  4:14 AM   Result Value Ref Range    WBC 4.9 4.8 - 10.8 K/uL    RBC 3.98 (L) 4.70 - 6.10 M/uL    Hemoglobin 12.5 (L) 14.0 - 18.0 g/dL    Hematocrit 38.3 (L) 42.0 - 52.0 %    MCV 96.2 81.4 - 97.8 fL    MCH 31.4 27.0 - 33.0 pg    MCHC 32.6 (L) 33.7 - 35.3 g/dL    RDW 48.0 35.9 - 50.0 fL    Platelet Count 153 (L) 164 - 446 K/uL    MPV 10.1 9.0 - 12.9 fL   Basic Metabolic Panel (BMP)    Collection Time: 11/12/19  4:14 AM   Result Value Ref Range    Sodium 140 135 - 145 mmol/L    Potassium 3.7 3.6 - 5.5 mmol/L    Chloride 100 96 - 112 mmol/L    Co2 31 20 - 33 mmol/L    Glucose 80 65 - 99 mg/dL    Bun 20 8 - 22 mg/dL    Creatinine 0.99 0.50 - 1.40 mg/dL    Calcium 9.5 8.5 - 10.5 mg/dL    Anion Gap 9.0 0.0 - 11.9   ESTIMATED GFR    Collection  Time: 11/12/19  4:14 AM   Result Value Ref Range    GFR If African American >60 >60 mL/min/1.73 m 2    GFR If Non African American >60 >60 mL/min/1.73 m 2       Fluids    Intake/Output Summary (Last 24 hours) at 11/12/2019 0840  Last data filed at 11/12/2019 0800  Gross per 24 hour   Intake 1926.67 ml   Output 2431 ml   Net -504.33 ml       Core Measures & Quality Metrics  Labs reviewed and Medications reviewed  Mei catheter: No Mei      DVT Prophylaxis: Heparin    Ulcer prophylaxis: Not indicated        ARVIND Score  ETOH Screening    Assessment/Plan  No new Assessment & Plan notes have been filed under this hospital service since the last note was generated.  Service: Surgery General      Discussed patient condition with RN and Patient. Dr. Caceres  CRITICAL CARE TIME EXCLUDING PROCEDURES: 23    minutes

## 2019-11-12 NOTE — PROGRESS NOTES
NGT fell out while patient sitting up in bed.  No c/o pain or nausea.  Passing gas.  + BS.  Will monitor and advance to sips of clears at noon as tolerated per Racheal COOK.

## 2019-11-12 NOTE — CARE PLAN
Problem: Safety  Goal: Will remain free from injury  Outcome: PROGRESSING AS EXPECTED     Problem: Venous Thromboembolism (VTW)/Deep Vein Thrombosis (DVT) Prevention:  Goal: Patient will participate in Venous Thrombosis (VTE)/Deep Vein Thrombosis (DVT)Prevention Measures  Outcome: PROGRESSING AS EXPECTED  Heparin SQ administered per MAR.      Problem: Bowel/Gastric:  Goal: Normal bowel function is maintained or improved  Outcome: PROGRESSING AS EXPECTED  NGT in place. Pt encouraged to ambulate to promote bowel motility.

## 2019-11-12 NOTE — CARE PLAN
Problem: Communication  Goal: The ability to communicate needs accurately and effectively will improve  Outcome: PROGRESSING AS EXPECTED     Problem: Safety  Goal: Will remain free from injury  Outcome: PROGRESSING AS EXPECTED  Goal: Will remain free from falls  Outcome: PROGRESSING AS EXPECTED     Problem: Bowel/Gastric:  Goal: Normal bowel function is maintained or improved  Outcome: PROGRESSING AS EXPECTED   Passing gas, no c/o pain or nausea.     Problem: Discharge Barriers/Planning  Goal: Patient's continuum of care needs will be met  Outcome: PROGRESSING AS EXPECTED     Problem: Pain Management  Goal: Pain level will decrease to patient's comfort goal  Outcome: PROGRESSING AS EXPECTED

## 2019-11-12 NOTE — PROGRESS NOTES
Patient is A&Ox4.   Denies pain. Pt does have an internal pain pump.   ANTHONY, CMS intact, denies numbness and tingling.   Generalized weakness. Educated to call for assistance. Ambulates with fww.   On RA, denies SOB or chest pain.   Hypoactive BS x 4. Strict NPO at this time. NGT in place to right nare, to LIS.   Negative flatus, BM at this time.   Positive void.   Swelling and duskiness noted to BLE.   Updated on POC. Belongings and call light within reach. All needs met at this time.     Refuses BA, educated on risk to fall, verbalizes understanding and still declines. Call light in reach, calls appropriately for assistance.

## 2019-11-13 VITALS
TEMPERATURE: 97.9 F | HEART RATE: 66 BPM | SYSTOLIC BLOOD PRESSURE: 125 MMHG | DIASTOLIC BLOOD PRESSURE: 81 MMHG | RESPIRATION RATE: 19 BRPM | BODY MASS INDEX: 24.39 KG/M2 | HEIGHT: 78 IN | WEIGHT: 210.76 LBS | OXYGEN SATURATION: 94 %

## 2019-11-13 PROBLEM — K56.609 SBO (SMALL BOWEL OBSTRUCTION) (HCC): Status: RESOLVED | Noted: 2019-11-11 | Resolved: 2019-11-13

## 2019-11-13 LAB
ALBUMIN SERPL BCP-MCNC: 3.3 G/DL (ref 3.2–4.9)
ALBUMIN/GLOB SERPL: 1.3 G/DL
ALP SERPL-CCNC: 88 U/L (ref 30–99)
ALT SERPL-CCNC: 13 U/L (ref 2–50)
ANION GAP SERPL CALC-SCNC: 7 MMOL/L (ref 0–11.9)
AST SERPL-CCNC: 18 U/L (ref 12–45)
BASOPHILS # BLD AUTO: 0.4 % (ref 0–1.8)
BASOPHILS # BLD: 0.02 K/UL (ref 0–0.12)
BILIRUB SERPL-MCNC: 1.4 MG/DL (ref 0.1–1.5)
BUN SERPL-MCNC: 17 MG/DL (ref 8–22)
CALCIUM SERPL-MCNC: 9.1 MG/DL (ref 8.5–10.5)
CHLORIDE SERPL-SCNC: 99 MMOL/L (ref 96–112)
CO2 SERPL-SCNC: 31 MMOL/L (ref 20–33)
CREAT SERPL-MCNC: 0.9 MG/DL (ref 0.5–1.4)
EOSINOPHIL # BLD AUTO: 0.07 K/UL (ref 0–0.51)
EOSINOPHIL NFR BLD: 1.6 % (ref 0–6.9)
ERYTHROCYTE [DISTWIDTH] IN BLOOD BY AUTOMATED COUNT: 47.9 FL (ref 35.9–50)
GLOBULIN SER CALC-MCNC: 2.5 G/DL (ref 1.9–3.5)
GLUCOSE SERPL-MCNC: 84 MG/DL (ref 65–99)
HCT VFR BLD AUTO: 38 % (ref 42–52)
HGB BLD-MCNC: 12.2 G/DL (ref 14–18)
IMM GRANULOCYTES # BLD AUTO: 0.01 K/UL (ref 0–0.11)
IMM GRANULOCYTES NFR BLD AUTO: 0.2 % (ref 0–0.9)
LYMPHOCYTES # BLD AUTO: 1.09 K/UL (ref 1–4.8)
LYMPHOCYTES NFR BLD: 24.2 % (ref 22–41)
MCH RBC QN AUTO: 31.3 PG (ref 27–33)
MCHC RBC AUTO-ENTMCNC: 32.1 G/DL (ref 33.7–35.3)
MCV RBC AUTO: 97.4 FL (ref 81.4–97.8)
MONOCYTES # BLD AUTO: 0.46 K/UL (ref 0–0.85)
MONOCYTES NFR BLD AUTO: 10.2 % (ref 0–13.4)
NEUTROPHILS # BLD AUTO: 2.85 K/UL (ref 1.82–7.42)
NEUTROPHILS NFR BLD: 63.4 % (ref 44–72)
NRBC # BLD AUTO: 0 K/UL
NRBC BLD-RTO: 0 /100 WBC
PLATELET # BLD AUTO: 150 K/UL (ref 164–446)
PMV BLD AUTO: 9.7 FL (ref 9–12.9)
POTASSIUM SERPL-SCNC: 3.5 MMOL/L (ref 3.6–5.5)
PROT SERPL-MCNC: 5.8 G/DL (ref 6–8.2)
RBC # BLD AUTO: 3.9 M/UL (ref 4.7–6.1)
SODIUM SERPL-SCNC: 137 MMOL/L (ref 135–145)
WBC # BLD AUTO: 4.5 K/UL (ref 4.8–10.8)

## 2019-11-13 PROCEDURE — A9270 NON-COVERED ITEM OR SERVICE: HCPCS | Performed by: FAMILY MEDICINE

## 2019-11-13 PROCEDURE — 700102 HCHG RX REV CODE 250 W/ 637 OVERRIDE(OP): Performed by: FAMILY MEDICINE

## 2019-11-13 PROCEDURE — 99239 HOSP IP/OBS DSCHRG MGMT >30: CPT | Performed by: FAMILY MEDICINE

## 2019-11-13 PROCEDURE — 80053 COMPREHEN METABOLIC PANEL: CPT

## 2019-11-13 PROCEDURE — 36415 COLL VENOUS BLD VENIPUNCTURE: CPT

## 2019-11-13 PROCEDURE — 85025 COMPLETE CBC W/AUTO DIFF WBC: CPT

## 2019-11-13 PROCEDURE — 700111 HCHG RX REV CODE 636 W/ 250 OVERRIDE (IP): Performed by: HOSPITALIST

## 2019-11-13 RX ADMIN — CARVEDILOL 1.56 MG: 6.25 TABLET, FILM COATED ORAL at 08:27

## 2019-11-13 RX ADMIN — HEPARIN SODIUM 5000 UNITS: 5000 INJECTION, SOLUTION INTRAVENOUS; SUBCUTANEOUS at 04:47

## 2019-11-13 RX ADMIN — GABAPENTIN 600 MG: 300 CAPSULE ORAL at 04:47

## 2019-11-13 RX ADMIN — SPIRONOLACTONE 25 MG: 50 TABLET ORAL at 04:47

## 2019-11-13 RX ADMIN — OMEPRAZOLE 20 MG: 20 CAPSULE, DELAYED RELEASE ORAL at 04:47

## 2019-11-13 RX ADMIN — GABAPENTIN 600 MG: 300 CAPSULE ORAL at 12:37

## 2019-11-13 RX ADMIN — FUROSEMIDE 20 MG: 20 TABLET ORAL at 04:47

## 2019-11-13 RX ADMIN — LOSARTAN POTASSIUM 25 MG: 50 TABLET, FILM COATED ORAL at 04:47

## 2019-11-13 RX ADMIN — FERROUS SULFATE TAB 325 MG (65 MG ELEMENTAL FE) 325 MG: 325 (65 FE) TAB at 04:47

## 2019-11-13 ASSESSMENT — ENCOUNTER SYMPTOMS
CARDIOVASCULAR NEGATIVE: 1
VOMITING: 0
NAUSEA: 0
CONSTITUTIONAL NEGATIVE: 1
RESPIRATORY NEGATIVE: 1
ABDOMINAL PAIN: 0

## 2019-11-13 NOTE — DISCHARGE SUMMARY
Discharge Summary    CHIEF COMPLAINT ON ADMISSION  No chief complaint on file.      Reason for Admission  Diaphragmatic Hernia     Admission Date  11/11/2019    CODE STATUS  Full Code    HPI & HOSPITAL COURSE  This is a 74 years old male with past medical history of CHF as well as remote history of abdominal surgery for hernia transferred from another facility for small bowel obstruction seen on CT scan of the abdomen pelvis.  General surgery consulted.  NG tube was placed.  There was accidentally removed by the patient.  No further nausea or vomiting.  Started on clear liquid diet per surgery.  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  He tolerated diet well as advanced.  No nausea or vomiting.  He had a bowel movement and was passing flatus.  He was hemodynamically and clinically stable.  He was cleared for discharge from medical standpoint.       Therefore, he is discharged in guarded and stable condition to home with close outpatient follow-up.    The patient met 2-midnight criteria for an inpatient stay at the time of discharge.    Discharge Date  11/13/2019    FOLLOW UP ITEMS POST DISCHARGE  Follow-up with PCP in 1 week  Follow-up with general surgery as per recommendations    DISCHARGE DIAGNOSES  Principal Problem (Resolved):    SBO (small bowel obstruction) (HCC) POA: Unknown  Active Problems:    Essential hypertension POA: Yes    Hyperlipidemia POA: Yes    Atrial fibrillation (HCC) POA: Yes    Systolic CHF (HCC) POA: Unknown      FOLLOW UP  No future appointments.  Candy Caceres M.D.  [id number]  [place] [address]  [phone]            MEDICATIONS ON DISCHARGE     Medication List      CONTINUE taking these medications      Instructions   CALCIUM + D PO   Take 1 Cap by mouth every day.  Dose:  1 Cap     Calcium-Vitamin D 600-400 MG-UNIT Tabs   Take 1 Tab by  mouth every day.  Dose:  1 Tab     carvedilol 3.125 MG Tabs  Commonly known as:  COREG   Take 3.125 mg by mouth every day.  Dose:  3.125 mg     docusate sodium 100 MG Caps  Commonly known as:  COLACE   Take 100 mg by mouth 3 times a day.  Dose:  100 mg     FEOSOL PO   Take 1 Tab by mouth every day.  Dose:  1 Tab     furosemide 20 MG Tabs  Commonly known as:  LASIX   Take 20 mg by mouth every day.  Dose:  20 mg     gabapentin 300 MG Caps  Commonly known as:  NEURONTIN   Take 600 mg by mouth 3 times a day.  Dose:  600 mg     losartan 25 MG Tabs  Commonly known as:  COZAAR   Take 25 mg by mouth 2 Times a Day.  Dose:  25 mg     Pain Pump Betzy  Commonly known as:  patient supplied   by Injection route Continuous. Patient's Pain Pump (placed and maintained as an outpatient)  Medications/concentrations:   Hydromorphone 12 mg/ml  Bupivacaine 20 mg/ml    Route: Intrathecal  Date of Placement: Sep 2015  Last changed: 8/12/19    Continuous infusion rates (Drug/Rate):  Hydromorphone 2.991 mg/day  Bupivacaine 4.985 mg/day    Patient activation dose:   Hydromorphone 0.200mg  3.766 mg/day  Bupivacaine 0.333 mg  6.277 mg/day  Patient activation lockout interval: 1hr  Maximum activations per day: 4     pantoprazole 40 MG Tbec  Commonly known as:  PROTONIX   Take 40 mg by mouth 2 Times a Day.  Dose:  40 mg     polyethylene glycol/lytes Pack  Commonly known as:  MIRALAX   Take 17 g by mouth every day.  Dose:  17 g     PRAVACHOL 40 MG tablet  Generic drug:  pravastatin   Take 40 mg by mouth every evening.  Dose:  40 mg     PROBIOTIC PO   Take 1 Cap by mouth every day.  Dose:  1 Cap     spironolactone 25 MG Tabs  Commonly known as:  ALDACTONE   Take 25 mg by mouth 2 Times a Day.  Dose:  25 mg        STOP taking these medications    doxycycline 100 MG Tabs  Commonly known as:  VIBRAMYCIN            Allergies  No Known Allergies    DIET  Orders Placed This Encounter   Procedures   • Diet Order Regular     Standing Status:   Standing      Number of Occurrences:   1     Order Specific Question:   Diet:     Answer:   Regular [1]     Order Specific Question:   Miscellaneous modifications:     Answer:   6 Small Meals [4]       ACTIVITY  As tolerated.  Weight bearing as tolerated    CONSULTATIONS  General surgery    PROCEDURES  None    LABORATORY  Lab Results   Component Value Date    SODIUM 137 11/13/2019    POTASSIUM 3.5 (L) 11/13/2019    CHLORIDE 99 11/13/2019    CO2 31 11/13/2019    GLUCOSE 84 11/13/2019    BUN 17 11/13/2019    CREATININE 0.90 11/13/2019        Lab Results   Component Value Date    WBC 4.5 (L) 11/13/2019    HEMOGLOBIN 12.2 (L) 11/13/2019    HEMATOCRIT 38.0 (L) 11/13/2019    PLATELETCT 150 (L) 11/13/2019        Total time of the discharge process exceeds 40 minutes.

## 2019-11-13 NOTE — PROGRESS NOTES
Trauma / Surgical Daily Progress Note    Date of Service  11/13/2019    Chief Complaint  74 y.o. male admitted 11/11/2019 with Diaphragmatic Hernia    Interval Events  Tolerating clears.  Stooling.  Denies any abdominal symptoms.  Advance diet.  OK for d/c from surgical stand point.    Review of Systems  Review of Systems   Constitutional: Negative.    Respiratory: Negative.    Cardiovascular: Negative.    Gastrointestinal: Negative for abdominal pain, nausea and vomiting.   Genitourinary: Negative for dysuria.   Skin: Negative.         Vital Signs  Temp:  [36.3 °C (97.3 °F)-36.7 °C (98 °F)] 36.6 °C (97.9 °F)  Pulse:  [61-66] 66  Resp:  [17-19] 19  BP: (119-144)/(81-97) 125/81  SpO2:  [94 %-98 %] 94 %    Physical Exam  Physical Exam  Cardiovascular:      Rate and Rhythm: Normal rate.      Pulses: Normal pulses.   Pulmonary:      Effort: Pulmonary effort is normal.   Abdominal:      General: Abdomen is flat. There is no distension.      Palpations: Abdomen is soft.   Skin:     General: Skin is warm and dry.   Neurological:      Mental Status: He is alert.         Laboratory  Recent Results (from the past 24 hour(s))   CBC WITH DIFFERENTIAL    Collection Time: 11/13/19  4:15 AM   Result Value Ref Range    WBC 4.5 (L) 4.8 - 10.8 K/uL    RBC 3.90 (L) 4.70 - 6.10 M/uL    Hemoglobin 12.2 (L) 14.0 - 18.0 g/dL    Hematocrit 38.0 (L) 42.0 - 52.0 %    MCV 97.4 81.4 - 97.8 fL    MCH 31.3 27.0 - 33.0 pg    MCHC 32.1 (L) 33.7 - 35.3 g/dL    RDW 47.9 35.9 - 50.0 fL    Platelet Count 150 (L) 164 - 446 K/uL    MPV 9.7 9.0 - 12.9 fL    Neutrophils-Polys 63.40 44.00 - 72.00 %    Lymphocytes 24.20 22.00 - 41.00 %    Monocytes 10.20 0.00 - 13.40 %    Eosinophils 1.60 0.00 - 6.90 %    Basophils 0.40 0.00 - 1.80 %    Immature Granulocytes 0.20 0.00 - 0.90 %    Nucleated RBC 0.00 /100 WBC    Neutrophils (Absolute) 2.85 1.82 - 7.42 K/uL    Lymphs (Absolute) 1.09 1.00 - 4.80 K/uL    Monos (Absolute) 0.46 0.00 - 0.85 K/uL    Eos (Absolute)  0.07 0.00 - 0.51 K/uL    Baso (Absolute) 0.02 0.00 - 0.12 K/uL    Immature Granulocytes (abs) 0.01 0.00 - 0.11 K/uL    NRBC (Absolute) 0.00 K/uL   Comp Metabolic Panel    Collection Time: 11/13/19  4:15 AM   Result Value Ref Range    Sodium 137 135 - 145 mmol/L    Potassium 3.5 (L) 3.6 - 5.5 mmol/L    Chloride 99 96 - 112 mmol/L    Co2 31 20 - 33 mmol/L    Anion Gap 7.0 0.0 - 11.9    Glucose 84 65 - 99 mg/dL    Bun 17 8 - 22 mg/dL    Creatinine 0.90 0.50 - 1.40 mg/dL    Calcium 9.1 8.5 - 10.5 mg/dL    AST(SGOT) 18 12 - 45 U/L    ALT(SGPT) 13 2 - 50 U/L    Alkaline Phosphatase 88 30 - 99 U/L    Total Bilirubin 1.4 0.1 - 1.5 mg/dL    Albumin 3.3 3.2 - 4.9 g/dL    Total Protein 5.8 (L) 6.0 - 8.2 g/dL    Globulin 2.5 1.9 - 3.5 g/dL    A-G Ratio 1.3 g/dL   ESTIMATED GFR    Collection Time: 11/13/19  4:15 AM   Result Value Ref Range    GFR If African American >60 >60 mL/min/1.73 m 2    GFR If Non African American >60 >60 mL/min/1.73 m 2       Fluids    Intake/Output Summary (Last 24 hours) at 11/13/2019 0841  Last data filed at 11/13/2019 0831  Gross per 24 hour   Intake 1206.67 ml   Output 2125 ml   Net -918.33 ml       Core Measures & Quality Metrics  Labs reviewed and Medications reviewed  Mei catheter: No Mei      DVT Prophylaxis: Heparin    Ulcer prophylaxis: Not indicated        ARVIND Score  ETOH Screening    Assessment/Plan  No new Assessment & Plan notes have been filed under this hospital service since the last note was generated.  Service: Surgery General      Discussed patient condition with RN and Patient. Dr. Caceres  CRITICAL CARE TIME EXCLUDING PROCEDURES: 23    minutes

## 2019-11-13 NOTE — PROGRESS NOTES
[facility] Medicine Daily Progress Note    Date of Service  11/12/2019    Chief Complaint  74 y.o. male admitted 11/11/2019 with abdominal pain, nausea and vomiting.    Hospital Course  This is a 74 years old male with past medical history of CHF as well as remote history of abdominal surgery for hernia transferred from another facility for small bowel obstruction seen on CT scan of the abdomen pelvis.  General surgery consulted.  NG tube was placed.  There was accidentally removed by the patient.  No further nausea or vomiting.  Started on clear liquid diet per surgery.  Interval Problem Update  Resting comfortably in bed.  No nausea or vomiting.  Passing flatus but no bowel movement.  Started on clears.  Pain is fairly controlled.  No acute distress noted.    Consultants/Specialty  General surgery    Code Status  Full code    Disposition  Likely home once medically cleared    Review of Systems  Review of Systems   Constitutional: Negative for chills and fever.   HENT: Negative for ear pain, hearing loss and tinnitus.    Eyes: Negative for blurred vision, double vision and photophobia.   Respiratory: Negative for cough, hemoptysis and sputum production.    Cardiovascular: Negative for chest pain, palpitations and orthopnea.   Gastrointestinal: Positive for abdominal pain, nausea and vomiting.   Genitourinary: Negative for frequency and urgency.   Musculoskeletal: Negative for back pain and neck pain.   Skin: Negative for rash.   Neurological: Negative for dizziness, tingling and headaches.   Endo/Heme/Allergies: Does not bruise/bleed easily.   Psychiatric/Behavioral: Negative for depression and suicidal ideas.        Physical Exam  Temp:  [36.5 °C (97.7 °F)-37.1 °C (98.7 °F)] 36.5 °C (97.7 °F)  Pulse:  [60-80] 60  Resp:  [17] 17  BP: (126-143)/(76-97) 127/76  SpO2:  [91 %] 91 %    Physical Exam  Constitutional:       General: He is not in acute distress.     Appearance: He is not ill-appearing.   HENT:      Head:  Normocephalic and atraumatic.      Nose: No rhinorrhea.      Mouth/Throat:      Pharynx: No oropharyngeal exudate or posterior oropharyngeal erythema.   Eyes:      Extraocular Movements: Extraocular movements intact.      Pupils: Pupils are equal, round, and reactive to light.   Neck:      Musculoskeletal: No neck rigidity.   Cardiovascular:      Rate and Rhythm: Normal rate and regular rhythm.      Heart sounds: No friction rub. No gallop.    Pulmonary:      Effort: Pulmonary effort is normal.      Breath sounds: No stridor. No wheezing.   Abdominal:      General: Bowel sounds are decreased. There is distension (Mild distention).      Palpations: There is no mass.      Tenderness: There is no tenderness.   Musculoskeletal:         General: No swelling or tenderness.   Skin:     Coloration: Skin is not jaundiced or pale.   Neurological:      General: No focal deficit present.      Mental Status: He is alert and oriented to person, place, and time.   Psychiatric:         Mood and Affect: Mood normal.         Fluids    Intake/Output Summary (Last 24 hours) at 11/12/2019 1629  Last data filed at 11/12/2019 1324  Gross per 24 hour   Intake 1526.67 ml   Output 2681 ml   Net -1154.33 ml       Laboratory  Recent Labs     11/10/19  2237 11/12/19  0414   WBC 6.9 4.9   RBC 4.36* 3.98*   HEMOGLOBIN 13.6* 12.5*   HEMATOCRIT 41.9* 38.3*   MCV 96.1* 96.2   MCH 31.2* 31.4   MCHC 32.5* 32.6*   RDW 13.6 48.0   PLATELETCT 176 153*   MPV 9.6 10.1     Recent Labs     11/10/19  2237 11/12/19  0414   SODIUM 139 140   POTASSIUM 4.0 3.7   CHLORIDE 101 100   CO2 29 31   GLUCOSE 88 80   BUN 25* 20   CREATININE 1.3 0.99   CALCIUM 9.4 9.5                   Imaging  OUTSIDE IMAGES-DX ABDOMEN   Final Result      OUTSIDE IMAGES-CT ABDOMEN /PELVIS   Final Result      DX-ABDOMEN FOR TUBE PLACEMENT   Final Result      Nasogastric tube in place as noted above.           Assessment/Plan  * SBO (small bowel obstruction) (HCC)  Assessment & Plan  CT  scan at the outside facility shows continued partial small bowel obstruction but by radiologist read this appears to be less severe than previous imaging.  Nevertheless, the patient's symptoms have returned and has now begun to feel improved with NG tube placement.    11/12: NG tube has been removed.  Patient is tolerating clears.  Advance diet as tolerated.  Ambulate.    Systolic CHF (HCC)  Assessment & Plan  Patient carries a systolic ejection fraction of 25%  Continue home medications.    Atrial fibrillation (HCC)- (present on admission)  Assessment & Plan  Continue rate control medications.   Patient is not on anticoagulation.    Hyperlipidemia- (present on admission)  Assessment & Plan  Continue statin.    Essential hypertension- (present on admission)  Assessment & Plan  Normotensive.  Continue to monitor.       VTE prophylaxis: SCDs

## 2019-11-13 NOTE — PROGRESS NOTES
Pt is tolerating a clear liquid diet with no pain or nausea reported. I consulted Dr. Wu of the hospitalist team regarding patient's IVF of LR at 100 mL/hr with concerns of his CHF and EF of 25%. Orders were given to saline lock the patient and discontinue the fluids.

## 2019-11-13 NOTE — PROGRESS NOTES
Pt discharged to home routine. IV removed. Pt tolerated it well. RN discussed discharge instructions with patient. Patient verbalized understanding of the teaching. Pt got dressed without assistance from staff. Pt gathered belongings without assistance from staff. Pt brought down to private vehicle in wheelchair by RN. Pt to be brought home in private vehicle by spouse.

## 2019-11-13 NOTE — PROGRESS NOTES
Pt laying in bed, call light within reach, bed lowered and locked, fall education reinforced. Pt is A&OX4 and on room air. Pt lung sounds are  Clear in all lobes, bowel sounds are hypoactive in all four quadrants, heart sounds are within defined limits. Pt IV is clean,dry,intact,and patent and saline locked. Pt has dusky dry bilateral lower extremities but skin is otherwise intact. Pt has a RLQ implanted pain pump unable to visualize. Pt is up stand-by assist with a FWW with a steady gait. Pt denies nausea and vomiting at this time and is tolerating a clear liquid diet.

## 2019-11-13 NOTE — DISCHARGE INSTRUCTIONS
Discharge Instructions    Discharged to home by car with relative. Discharged via walking, hospital escort: Refused.  Special equipment needed: Not Applicable    Be sure to schedule a follow-up appointment with your primary care doctor or any specialists as instructed.     Discharge Plan:   Diet Plan: Discussed  Activity Level: Discussed  Confirmed Follow up Appointment: Patient to Call and Schedule Appointment  Confirmed Symptoms Management: Discussed  Medication Reconciliation Updated: Yes  Influenza Vaccine Indication: Not indicated: Previously immunized this influenza season and > 8 years of age(Vaccinated this last September)    I understand that a diet low in cholesterol, fat, and sodium is recommended for good health. Unless I have been given specific instructions below for another diet, I accept this instruction as my diet prescription.   Other diet: Regular as tolerated    Special Instructions: None    · Is patient discharged on Warfarin / Coumadin?   No     Depression / Suicide Risk    As you are discharged from this [facility] facility, it is important to learn how to keep safe from harming yourself.    Recognize the warning signs:  · Abrupt changes in personality, positive or negative- including increase in energy   · Giving away possessions  · Change in eating patterns- significant weight changes-  positive or negative  · Change in sleeping patterns- unable to sleep or sleeping all the time   · Unwillingness or inability to communicate  · Depression  · Unusual sadness, discouragement and loneliness  · Talk of wanting to die  · Neglect of personal appearance   · Rebelliousness- reckless behavior  · Withdrawal from people/activities they love  · Confusion- inability to concentrate     If you or a loved one observes any of these behaviors or has concerns about self-harm, here's what you can do:  · Talk about it- your feelings and reasons for harming yourself  · Remove any means that you might use to  hurt yourself (examples: pills, rope, extension cords, firearm)  · Get professional help from the community (Mental Health, Substance Abuse, psychological counseling)  · Do not be alone:Call your Safe Contact- someone whom you trust who will be there for you.  · Call your local CRISIS HOTLINE 409-8432 or 504-930-8203  · Call your local Children's Mobile Crisis Response Team [place] (136) 910-4329 or www.jaeyos  · Call the toll free National Suicide Prevention Hotlines   · National Suicide Prevention Lifeline 299-322-ZBHA (8096)  · [employer] Hope Line Network 800-SUICIDE (371-5920)    Small Bowel Obstruction  A small bowel obstruction means that something is blocking the small bowel. The small bowel is also called the small intestine. It is the long tube that connects the stomach to the colon. An obstruction will stop food and fluids from passing through the small bowel. Treatment depends on what is causing the problem and how bad the problem is.  Follow these instructions at home:  · Get a lot of rest.  · Follow your diet as told by your doctor. You may need to:  ¨ Only drink clear liquids until you start to get better.  ¨ Avoid solid foods as told by your doctor.  · Take over-the-counter and prescription medicines only as told by your doctor.  · Keep all follow-up visits as told by your doctor. This is important.  Contact a doctor if:  · You have a fever.  · You have chills.  Get help right away if:  · You have pain or cramps that get worse.  · You throw up (vomit) blood.  · You have a feeling of being sick to your stomach (nausea) that does not go away.  · You cannot stop throwing up.  · You cannot drink fluids.  · You feel confused.  · You feel dry or thirsty (dehydrated).  · Your belly gets more bloated.  · You feel weak or you pass out (faint).  This information is not intended to replace advice given to you by your health care provider. Make sure you discuss any questions you have with your  health care provider.  Document Released: 01/25/2006 Document Revised: 08/14/2017 Document Reviewed: 02/11/2016  [employer] Interactive Patient Education © 2017 [employer]

## 2021-06-09 ENCOUNTER — PHYSICAL THERAPY (OUTPATIENT)
Dept: PHYSICAL THERAPY | Facility: MEDICAL CENTER | Age: 76
End: 2021-06-09
Attending: ORTHOPAEDIC SURGERY
Payer: MEDICARE

## 2021-06-09 DIAGNOSIS — Z96.651 S/P REVISION OF TOTAL KNEE, RIGHT: ICD-10-CM

## 2021-06-09 DIAGNOSIS — Z96.651 PRESENCE OF RIGHT ARTIFICIAL KNEE JOINT: ICD-10-CM

## 2021-06-09 PROCEDURE — 999999 HB NO CHARGE

## 2021-06-09 NOTE — OP THERAPY EVALUATION
Patient was seen for PT rosina but notes refer to for lymphedema specialist. Unfortunately we do not do this at our  office. We have a therapist coming but not for a another week or 2 weeks. We will get him scheduled with the right provider. This is a no charge visit.

## 2021-06-14 ENCOUNTER — APPOINTMENT (OUTPATIENT)
Dept: PHYSICAL THERAPY | Facility: MEDICAL CENTER | Age: 76
End: 2021-06-14
Attending: ORTHOPAEDIC SURGERY
Payer: MEDICARE

## 2021-06-17 ENCOUNTER — PHYSICAL THERAPY (OUTPATIENT)
Dept: PHYSICAL THERAPY | Facility: REHABILITATION | Age: 76
End: 2021-06-17
Attending: ORTHOPAEDIC SURGERY
Payer: MEDICARE

## 2021-06-17 ENCOUNTER — APPOINTMENT (OUTPATIENT)
Dept: PHYSICAL THERAPY | Facility: MEDICAL CENTER | Age: 76
End: 2021-06-17
Attending: ORTHOPAEDIC SURGERY
Payer: MEDICARE

## 2021-06-17 DIAGNOSIS — I89.0 LYMPHEDEMA OF BOTH LOWER EXTREMITIES: ICD-10-CM

## 2021-06-17 DIAGNOSIS — Z96.651 PRESENCE OF RIGHT ARTIFICIAL KNEE JOINT: ICD-10-CM

## 2021-06-17 PROCEDURE — 97162 PT EVAL MOD COMPLEX 30 MIN: CPT
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
## 2021-06-22 ENCOUNTER — TELEPHONE (OUTPATIENT)
Dept: PHYSICAL THERAPY | Facility: REHABILITATION | Age: 76
End: 2021-06-22

## 2021-06-22 NOTE — OP THERAPY DAILY TREATMENT
Received phone message from Yajaira in Dr. Peter Montana's office. Jimmie has been cleared to begin CDT from a cardiac standpoint.

## 2021-06-22 NOTE — OP THERAPY DAILY TREATMENT
Received clearance from pt's cardiologist (Dr. Peter Montana). Per Yajaira from Dr. Montana's office ([phone]),  does not have restrictions for CDT.

## 2021-06-30 ENCOUNTER — APPOINTMENT (OUTPATIENT)
Dept: PHYSICAL THERAPY | Facility: MEDICAL CENTER | Age: 76
End: 2021-06-30
Attending: ORTHOPAEDIC SURGERY
Payer: MEDICARE

## 2021-07-09 ENCOUNTER — PHYSICAL THERAPY (OUTPATIENT)
Dept: PHYSICAL THERAPY | Facility: REHABILITATION | Age: 76
End: 2021-07-09
Attending: ORTHOPAEDIC SURGERY
Payer: MEDICARE

## 2021-07-09 DIAGNOSIS — I89.0 LYMPHEDEMA OF BOTH LOWER EXTREMITIES: ICD-10-CM

## 2021-07-09 PROCEDURE — 97535 SELF CARE MNGMENT TRAINING: CPT

## 2021-07-09 PROCEDURE — 97140 MANUAL THERAPY 1/> REGIONS: CPT

## 2021-07-09 NOTE — OP THERAPY DAILY TREATMENT
Outpatient Physical Therapy  LYMPHEDEMA THERAPY DAILY TREATMENT     [facility] Physical Therapy [address]  [address]  [place] [address]  Phone:  [phone]  Fax:  [phone]    Date: 07/09/2021    Patient: Jimmie Zamudio  YOB: 1945  MRN: [id number]     Time Calculation    Start time: 1529  Stop time: 1706 Time Calculation (min): 97 minutes         Chief Complaint: Edema    Visit #: 2    Subjective:   History of Present Illness:     Mechanism of injury:  Pt reporting eagerness to address his swelling. Has continued going to gym. Recalled all precautions and information from previous session.       Lymphedema Objective    Left Lower Extremity Circumferential Measurements  Waist: cm  Hip: cm  Scrotum: cm  Ground/Upper Thigh: 62.5 cm  Mid Thigh: 58 cm  Knee: 54 cm  Upper Calf: 57.5 cm  Mid Calf: 49.5 cm  Ankle: 33.5 cm  Heel to Foot: 27 cm  Total: 342 cm    Right Lower Extremity Circumferential Measurements  Waist: cm  Hip: cm  Scrotum: cm  Ground/Upper Thigh: 60.5 cm  Mid Thigh: 60 cm  Knee: 53.5 cm  Upper Calf: 62 cm  Mid Calf: 57 cm  Ankle: 34.5 cm  Heel to Foot: 27.5 cm  Total: 355 cm    Lymphedema Stage  Stage 3 Lymphedema    It does appear that pt has a large amount of fibrotic tissue to B LE, rather firm distal to both knees, but both popliteal fossas have looser clusters of fibrosis. Medial thighs have looser clusters as well. B LE have marked lymphatic cysts. Distal B LE with dry skin. Applied lotion to LE including toes. Bilateral toes are odorous, non-sweet.        Left Lower Extremity Circumferential Measurements  Ground/Upper Thigh: 62 cm  Mid Thigh: 60 cm  Knee: 56.5 cm57.5  Upper Calf: 55.5 cm  Mid Calf: 42.5 cm  Ankle: 33 cm  Heel to Foot: 28.5 cm  Total: 338 cm    Right Lower Extremity Circumferential Measurements  Ground/Upper Thigh: 59 cm  Mid Thigh: 63 cm  Knee: 56.5 cm  Upper Calf: 62 cm  Mid Calf: 45 cm  Ankle: 35 cm  Heel to Foot: 29 cm  Total: 349.5  cm      Other Notes  R:Mid calf 20cm from heel. Upper 46cm  L Mc 21cm from ground. Upper widest portion       Therapeutic Treatments and Modalities:     1. Manual Therapy (CPT 32547), MLD for B LE without trunkal involvement. Address R side as R side with larger circumference requiring decongestion.     2. Functional Training, Self Care (CPT 75270), Educated both pt and spouse on POC and intensity of CDT. Provided handout regarding lyphedema do's and donts.     Applied bandages to B LE. Low stretch as pt reporting discomfort to 50% stretch. Educated pt and wife to keep bandages on for as long as possible, ideally until next session. Reinforced that if discomfort that does not barb is present, ok to remove bandages and will address next session.  Time-based treatments/modalities:    Physical Therapy Timed Treatment Charges  Functional training, self care minutes (CPT 02513): 32 minutes  Manual therapy minutes (CPT 95994): 33 minutes      Assessment and Plan:   Assessment details:  Pt with fibrotic tissue changes to B LE and hemosiderin staining distal to knee as well. He was unable to tolerate 50% stretch, and a lighter stretch was applied.   Prognosis: fair    Discussed with:  Patient and family

## 2021-07-12 ENCOUNTER — PHYSICAL THERAPY (OUTPATIENT)
Dept: PHYSICAL THERAPY | Facility: REHABILITATION | Age: 76
End: 2021-07-12
Attending: ORTHOPAEDIC SURGERY
Payer: MEDICARE

## 2021-07-12 DIAGNOSIS — I89.0 LYMPHEDEMA OF BOTH LOWER EXTREMITIES: ICD-10-CM

## 2021-07-12 PROCEDURE — 97140 MANUAL THERAPY 1/> REGIONS: CPT

## 2021-07-12 PROCEDURE — 97535 SELF CARE MNGMENT TRAINING: CPT

## 2021-07-12 NOTE — OP THERAPY DAILY TREATMENT
Outpatient Physical Therapy  LYMPHEDEMA THERAPY DAILY TREATMENT     [facility] Physical Therapy [address]  [address]  [place] [address]  Phone:  [phone]  Fax:  [phone]    Date: 07/12/2021    Patient: Jimmie Zamudio  YOB: 1945  MRN: [id number]     Time Calculation    Start time: 0859  Stop time: 1040 Time Calculation (min): 101 minutes         Chief Complaint: Edema    Visit #: 3    Subjective:   History of Present Illness:     Mechanism of injury:  Pt reporting he had to re-tape the proximal portion of his bandages but was able to keep them on all weekend. No pain.       Lymphedema Objective    Left Lower Extremity Circumferential Measurements  Waist: cm  Hip: cm  Scrotum: cm  Ground/Upper Thigh: 60.5 cm  Mid Thigh: 57 cm  Knee: 47 cm  Upper Calf: 53.5 cm  Mid Calf: 45 cm  Ankle: 32 cm  Heel to Foot: 25.5 cm  Total: 320.5 cm    Right Lower Extremity Circumferential Measurements  Waist: cm  Hip: cm  Scrotum: cm  Ground/Upper Thigh: 58.5 cm  Mid Thigh: 56.5 cm  Knee: 50 cm  Upper Calf: 60.5 cm  Mid Calf: 47 cm  Ankle: 33 cm  Heel to Foot: 26.5 cm  Total: 332 cm    Lymphedema Stage  Stage 3 Lymphedema         Left Lower Extremity Circumferential Measurements 7/9  Ground/Upper Thigh: 62.5 cm  Mid Thigh: 58 cm  Knee: 54 cm  Upper Calf: 57.5 cm  Mid Calf: 49.5 cm  Ankle: 33.5 cm  Heel to Foot: 27 cm  Total: 342 cm     Right Lower Extremity Circumferential Measurements 7/9  Ground/Upper Thigh: 60.5 cm  Mid Thigh: 60 cm  Knee: 53.5 cm  Upper Calf: 62 cm  Mid Calf: 57 cm  Ankle: 34.5 cm  Heel to Foot: 27.5 cm  Total: 355 cm       B LE bandaged with 50% overlap but unable to achieve 50% stretch due to discomfort. R toes were not bandaged today as with bandage, pt reporting tingling that would not barb without removal. Will require further shaping next time to address fibrotic areas.     Therapeutic Treatments and Modalities:     1. Manual Therapy (CPT 71705), Bilateral LE MLD  with intact lymph nodes. Focus on R LE decongestion.     Time-based treatments/modalities:    Physical Therapy Timed Treatment Charges  Functional training, self care minutes (CPT 71702): 31 minutes  Manual therapy minutes (CPT 19964): 35 minutes      Assessment and Plan:   Assessment details:  Pt with a reduction in total limb circumference to bilateral LE (L LE from 342cm upon eval; 320.5cm today. R LE 355cm upon eval, 332cm today). Anticipate reduction of distal LE to be slowed due to fibrotic tissue. Unfortunately, pt is not able to tolerate 50% stretch and a lighter compression was utilized. Anticipate further reduction with ongoing MLD and compression.      Plan:  Therapy options:  Physical therapy treatment to continue  Discussed with:  Patient and family

## 2021-07-14 ENCOUNTER — PHYSICAL THERAPY (OUTPATIENT)
Dept: PHYSICAL THERAPY | Facility: REHABILITATION | Age: 76
End: 2021-07-14
Attending: ORTHOPAEDIC SURGERY
Payer: MEDICARE

## 2021-07-14 DIAGNOSIS — I89.0 LYMPHEDEMA OF BOTH LOWER EXTREMITIES: ICD-10-CM

## 2021-07-14 PROCEDURE — 97140 MANUAL THERAPY 1/> REGIONS: CPT

## 2021-07-14 PROCEDURE — 97535 SELF CARE MNGMENT TRAINING: CPT

## 2021-07-14 PROCEDURE — 97110 THERAPEUTIC EXERCISES: CPT

## 2021-07-14 NOTE — OP THERAPY DAILY TREATMENT
Outpatient Physical Therapy  LYMPHEDEMA THERAPY DAILY TREATMENT     [facility] Physical Therapy [address]  [address]  [place] [address]  Phone:  [phone]  Fax:  [phone]    Date: 07/14/2021    Patient: Jimmie Zamudio  YOB: 1945  MRN: [id number]     Time Calculation    Start time: 0904  Stop time: 1032 Time Calculation (min): 88 minutes         Chief Complaint: No chief complaint on file.    Visit #: 4    Subjective: Pt reports he has not been going to gym mostly due to fatigue from appts but also due to having B LE wrapped.     Lymphedema Objective    Left Lower Extremity Circumferential Measurements  Waist: cm  Hip: cm  Scrotum: cm  Ground/Upper Thigh: 58.5 cm  Mid Thigh: 59.5 cm  Knee: 47.5 cm  Upper Calf: 51 cm  Mid Calf: 46 cm  Ankle: 31 cm  Heel to Foot: 25.5 cm  Total: 319 cm    Right Lower Extremity Circumferential Measurements  Waist: cm  Hip: cm  Scrotum: cm  Ground/Upper Thigh: 59 cm  Mid Thigh: 55 cm  Knee: 50 cm  Upper Calf: 60 cm  Mid Calf: 52.5 cm  Ankle: 33 cm  Heel to Foot: 27.5 cm  Total: 337 cm           Left Lower Extremity Circumferential Measurements  Ground/Upper Thigh: 60.5 cm  Mid Thigh: 57 cm  Knee: 47 cm  Upper Calf: 53.5 cm  Mid Calf: 45 cm  Ankle: 32 cm  Heel to Foot: 25.5 cm  Total: 320.5 cm     Right Lower Extremity Circumferential Measurements  Ground/Upper Thigh: 58.5 cm  Mid Thigh: 56.5 cm  Knee: 50 cm  Upper Calf: 60.5 cm  Mid Calf: 47 cm  Ankle: 33 cm  Heel to Foot: 26.5 cm  Total: 332 cm     Left Lower Extremity Circumferential Measurements 7/9  Ground/Upper Thigh: 62.5 cm  Mid Thigh: 58 cm  Knee: 54 cm  Upper Calf: 57.5 cm  Mid Calf: 49.5 cm  Ankle: 33.5 cm  Heel to Foot: 27 cm  Total: 342 cm     Right Lower Extremity Circumferential Measurements 7/9  Ground/Upper Thigh: 60.5 cm  Mid Thigh: 60 cm  Knee: 53.5 cm  Upper Calf: 62 cm  Mid Calf: 57 cm  Ankle: 34.5 cm  Heel to Foot: 27.5 cm  Total: 355 cm    Applied short stretch bandages  "to B LE. Added kidney pieces to L ankle as well as small foam bag to posteromedial aspect of L knee to address fibrotic area. Pt unable to tolerate kidneys to R ankle but was able to tolerate 1/4\" grey foam around R knee to increase cylindrical shape to facilitate volume gradient. Pt still unable to tolerate 50% stretch.     Therapeutic Exercises (CPT 37466):       Therapeutic Exercise Summary: Hip flexion/extension  Knee flexion/extension  Ankle pumps    Pt performed while MLD was being applied to contralateral limb without bandage as well as once bandage had been applied and PT was addressing contralateral LE. Has been educated on performing at home, especially since pt has not been going to gym.     Therapeutic Treatments and Modalities:     1. Manual Therapy (CPT 41302), MLD R LE for B LE without trunk involvement.     Time-based treatments/modalities:    Physical Therapy Timed Treatment Charges  Functional training, self care minutes (CPT 69992): 18 minutes  Manual therapy minutes (CPT 33503): 25 minutes  Therapeutic exercise minutes (CPT 21628): 11 minutes      Assessment and Plan:   Assessment details:  Pt with an increase in circumferential measurements today bilaterally. Suspect this may be in part due to inability to tolerate 50% stretch to bandages, especially to R LE. Added foam components today to address fibrosis and knee shape. Will attempt further foam application next session if pt able to tolerate those additions this time.     Plan:  Therapy options:  Physical therapy treatment to continue  Discussed with:  Patient and family           "

## 2021-07-16 ENCOUNTER — PHYSICAL THERAPY (OUTPATIENT)
Dept: PHYSICAL THERAPY | Facility: REHABILITATION | Age: 76
End: 2021-07-16
Attending: ORTHOPAEDIC SURGERY
Payer: MEDICARE

## 2021-07-16 DIAGNOSIS — I89.0 LYMPHEDEMA OF BOTH LOWER EXTREMITIES: ICD-10-CM

## 2021-07-16 PROCEDURE — 97535 SELF CARE MNGMENT TRAINING: CPT

## 2021-07-16 PROCEDURE — 97110 THERAPEUTIC EXERCISES: CPT

## 2021-07-16 PROCEDURE — 97140 MANUAL THERAPY 1/> REGIONS: CPT

## 2021-07-16 NOTE — OP THERAPY DAILY TREATMENT
Outpatient Physical Therapy  LYMPHEDEMA THERAPY DAILY TREATMENT     [facility] Physical Therapy [address]  [address] [facility] [address] [facility]  [place] [address]  Phone:  [phone]  Fax:  [phone]    Date: 07/16/2021    Patient: Jimmie Zamudio  YOB: 1945  MRN: [id number]     Time Calculation    Start time: 0810  Stop time: 0943 Time Calculation (min): 93 minutes         Chief Complaint: Edema    Visit #: 5    Subjective: Pt reporting he did not notice foam chips or kidneys under wrapping.     Lymphedema Objective    Left Lower Extremity Circumferential Measurements  Waist: cm  Hip: cm  Scrotum: cm  Ground/Upper Thigh: 56 cm  Mid Thigh: 57 cm  Knee: 49.5 cm  Upper Calf: 52 cm  Mid Calf: 46 cm  Ankle: 30 cm  Heel to Foot: 26 cm  Total: 316.5 cm    Right Lower Extremity Circumferential Measurements  Waist: cm  Hip: cm  Scrotum: cm  Ground/Upper Thigh: 57.5 cm  Mid Thigh: 55 cm  Knee: 49 cm  Upper Calf: 57.5 cm  Mid Calf: 50 cm  Ankle: 32 cm  Heel to Foot: 26.5 cm  Total: 327.5 cm      Left Lower Extremity Circumferential Measurements 7/14  Ground/Upper Thigh: 58.5 cm  Mid Thigh: 59.5 cm  Knee: 47.5 cm  Upper Calf: 51 cm  Mid Calf: 46 cm  Ankle: 31 cm  Heel to Foot: 25.5 cm  Total: 319 cm     Right Lower Extremity Circumferential Measurements 7/14  Ground/Upper Thigh: 59 cm  Mid Thigh: 55 cm  Knee: 50 cm  Upper Calf: 60 cm  Mid Calf: 52.5 cm  Ankle: 33 cm  Heel to Foot: 27.5 cm  Total: 337 cm       Left Lower Extremity Circumferential Measurements7/12  Ground/Upper Thigh: 60.5 cm  Mid Thigh: 57 cm  Knee: 47 cm  Upper Calf: 53.5 cm  Mid Calf: 45 cm  Ankle: 32 cm  Heel to Foot: 25.5 cm  Total: 320.5 cm     Right Lower Extremity Circumferential Measurements 7/12  Ground/Upper Thigh: 58.5 cm  Mid Thigh: 56.5 cm  Knee: 50 cm  Upper Calf: 60.5 cm  Mid Calf: 47 cm  Ankle: 33 cm  Heel to Foot: 26.5 cm  Total: 332 cm     Left Lower Extremity Circumferential Measurements 7/9  Ground/Upper Thigh: 62.5 cm  Mid  "Thigh: 58 cm  Knee: 54 cm  Upper Calf: 57.5 cm  Mid Calf: 49.5 cm  Ankle: 33.5 cm  Heel to Foot: 27 cm  Total: 342 cm     Right Lower Extremity Circumferential Measurements 7/9  Ground/Upper Thigh: 60.5 cm  Mid Thigh: 60 cm  Knee: 53.5 cm  Upper Calf: 62 cm  Mid Calf: 57 cm  Ankle: 34.5 cm  Heel to Foot: 27.5 cm  Total: 355 cm        Therapeutic Exercises (CPT 55512):       Therapeutic Exercise Summary: Once LE wrapped, pt completed ankle pumps, knee flexion/extension in sitting. In standing, knee flexion/extension and hip flexion/extension. x5 reps in varying positions.     Therapeutic Treatments and Modalities:     1. Manual Therapy (CPT 80000), MLD for R LE for B LE lymphedema without trunk involvement.     Applied multi-layer compression to B LE. Today, applied grey foam to distal aspect of R LE and orange foam to proximal aspect of R LE. Small amount of 1/4\" foam applied to knee to reduce hour-glass shape. Pt reporting absence of discomfort. Chip bag applied to L posteromedial aspect of knee to address fibrotic pocket. Pt declined foam to L LE as he was unsure if he could drive with both LE having extra volume. As pt has been unable to tolerate 50% stretch, today, therapist applied 75% overlap instead to facilitate compression.      Time-based treatments/modalities:           Assessment and Plan:   Assessment details:  Pt achieved a 10cm reduction in total circumference to R LE from last visit. L LE reduced 2.5cm total. Pt is somewhat limited in his tolerance to more aggressive compression, including stretch to bandage. Will evaluate pt's tolerance to foam application next visit.       Plan:  Therapy options:  Physical therapy treatment to continue  Discussed with:  Patient           "

## 2021-07-19 ENCOUNTER — PHYSICAL THERAPY (OUTPATIENT)
Dept: PHYSICAL THERAPY | Facility: REHABILITATION | Age: 76
End: 2021-07-19
Attending: ORTHOPAEDIC SURGERY
Payer: MEDICARE

## 2021-07-19 DIAGNOSIS — I89.0 LYMPHEDEMA OF BOTH LOWER EXTREMITIES: ICD-10-CM

## 2021-07-19 PROCEDURE — 97110 THERAPEUTIC EXERCISES: CPT

## 2021-07-19 PROCEDURE — 97140 MANUAL THERAPY 1/> REGIONS: CPT

## 2021-07-19 PROCEDURE — 97535 SELF CARE MNGMENT TRAINING: CPT

## 2021-07-19 NOTE — OP THERAPY DAILY TREATMENT
Outpatient Physical Therapy  LYMPHEDEMA THERAPY DAILY TREATMENT     [facility] Physical Therapy [address].  [address]  [place] [address]  Phone:  [phone]  Fax:  [phone]    Date: 07/19/2021    Patient: Jimmie Zamudio  YOB: 1945  MRN: [id number]     Time Calculation    Start time: 0904  Stop time: 1047 Time Calculation (min): 103 minutes         Chief Complaint: Edema    Visit #: 6    Subjective: Pt able to maintain foam and compression bandages all weekend. Reported legs to be extra warm, but not other difficulty.     Lymphedema Objective    Left Lower Extremity Circumferential Measurements  Waist: cm  Hip: cm  Scrotum: cm  Ground/Upper Thigh: 61 cm  Mid Thigh: 56.5 cm  Knee: 49 cm  Upper Calf: 50 cm  Mid Calf: 40.5 cm  Ankle: 30.5 cm  Heel to Foot: 26 cm  Total: 313.5 cm    Right Lower Extremity Circumferential Measurements  Waist: cm  Hip: cm  Scrotum: cm  Ground/Upper Thigh: 57 cm  Mid Thigh: 55.5 cm  Knee: 48 cm  Upper Calf: 58 cm  Mid Calf: 47 cm  Ankle: 33.5 cm  Heel to Foot: 26.5 cm  Total: 325.5 cm    Pt with palpable improvement to R thigh fibrotic deposits after wearing orange foam over weekend.        Left Lower Extremity Circumferential Measurements 7/16  Waist: cm  Hip: cm  Scrotum: cm  Ground/Upper Thigh: 56 cm  Mid Thigh: 57 cm  Knee: 49.5 cm  Upper Calf: 52 cm  Mid Calf: 46 cm  Ankle: 30 cm  Heel to Foot: 26 cm  Total: 316.5 cm     Right Lower Extremity Circumferential Measurements 7/16  Waist: cm  Hip: cm  Scrotum: cm  Ground/Upper Thigh: 57.5 cm  Mid Thigh: 55 cm  Knee: 49 cm  Upper Calf: 57.5 cm  Mid Calf: 50 cm  Ankle: 32 cm  Heel to Foot: 26.5 cm  Total: 327.5 cm        Left Lower Extremity Circumferential Measurements 7/14  Ground/Upper Thigh: 58.5 cm  Mid Thigh: 59.5 cm  Knee: 47.5 cm  Upper Calf: 51 cm  Mid Calf: 46 cm  Ankle: 31 cm  Heel to Foot: 25.5 cm  Total: 319 cm     Right Lower Extremity Circumferential Measurements 7/14  Ground/Upper  "Thigh: 59 cm  Mid Thigh: 55 cm  Knee: 50 cm  Upper Calf: 60 cm  Mid Calf: 52.5 cm  Ankle: 33 cm  Heel to Foot: 27.5 cm  Total: 337 cm       Left Lower Extremity Circumferential Measurements7/12  Ground/Upper Thigh: 60.5 cm  Mid Thigh: 57 cm  Knee: 47 cm  Upper Calf: 53.5 cm  Mid Calf: 45 cm  Ankle: 32 cm  Heel to Foot: 25.5 cm  Total: 320.5 cm     Right Lower Extremity Circumferential Measurements 7/12  Ground/Upper Thigh: 58.5 cm  Mid Thigh: 56.5 cm  Knee: 50 cm  Upper Calf: 60.5 cm  Mid Calf: 47 cm  Ankle: 33 cm  Heel to Foot: 26.5 cm  Total: 332 cm     Left Lower Extremity Circumferential Measurements 7/9  Ground/Upper Thigh: 62.5 cm  Mid Thigh: 58 cm  Knee: 54 cm  Upper Calf: 57.5 cm  Mid Calf: 49.5 cm  Ankle: 33.5 cm  Heel to Foot: 27 cm  Total: 342 cm     Right Lower Extremity Circumferential Measurements 7/9  Ground/Upper Thigh: 60.5 cm  Mid Thigh: 60 cm  Knee: 53.5 cm  Upper Calf: 62 cm  Mid Calf: 57 cm  Ankle: 34.5 cm  Heel to Foot: 27.5 cm  Total: 355 cm    Therapeutic Exercises (CPT 26828):       Therapeutic Exercise Summary: Pt completed ankle pumps, knee flexion/extension in sitting. In standing, knee flexion/extension and hip flexion/extension. x5 reps in varying positions. Performed with LE wrapped and unwrapped.    Therapeutic Treatments and Modalities:     1. Manual Therapy (CPT 85842), MLD for R LE for B LE lymphedema without trunk involvement.     Therapeutic Treatment and Modalities Summary: Applied multi-layer compression bandages to B LE. Pt continues to be unable to tolerate 50% stretch, but is able to tolerate 75% overlap instead. Today, 1/2\" grey foam was applied to bilateral distal LE and orange foam was applied to proximal L LE. Chip bag to medial proximal R LE for fibrosis.     Time-based treatments/modalities:    Physical Therapy Timed Treatment Charges  Functional training, self care minutes (CPT 33472): 9 minutes  Manual therapy minutes (CPT 86664): 33 minutes  Therapeutic exercise " minutes (CPT 31578): 13 minutes      Assessment and Plan:   Assessment details:  Pt with improvement in tissue quality to proximal R LE as well as reduction in total circumference to B LE. Distal R LE remains limited by fibrotic quality of tissue requiring ongoing compression with foam. L LE with multiple fibrotic deposits which will be addressed by addition of orange foam today. Pt is slightly limited by intolerance to necessary compression.     Plan:  Therapy options:  Physical therapy treatment to continue  Discussed with:  Patient and family

## 2021-07-21 ENCOUNTER — PHYSICAL THERAPY (OUTPATIENT)
Dept: PHYSICAL THERAPY | Facility: REHABILITATION | Age: 76
End: 2021-07-21
Attending: ORTHOPAEDIC SURGERY
Payer: MEDICARE

## 2021-07-21 DIAGNOSIS — I89.0 LYMPHEDEMA OF BOTH LOWER EXTREMITIES: ICD-10-CM

## 2021-07-21 PROCEDURE — 97535 SELF CARE MNGMENT TRAINING: CPT

## 2021-07-21 PROCEDURE — 97140 MANUAL THERAPY 1/> REGIONS: CPT

## 2021-07-21 NOTE — OP THERAPY DAILY TREATMENT
Outpatient Physical Therapy  LYMPHEDEMA THERAPY DAILY TREATMENT     [facility] Physical Therapy [address].  [address]  [place] [address]  Phone:  [phone]  Fax:  [phone]    Date: 07/21/2021    Patient: Jimmie Zamudio  YOB: 1945  MRN: [id number]     Time Calculation    Start time: 0900  Stop time: 1027 Time Calculation (min): 87 minutes         Chief Complaint: Edema    Visit #: 7    Subjective:   History of Present Illness:     Mechanism of injury:  Pt able to wear bandages all day/night without issue. Would like to be able to shower.       Lymphedema Objective    Left Lower Extremity Circumferential Measurements  Waist: cm  Hip: cm  Scrotum: cm  Ground/Upper Thigh: 59 cm  Mid Thigh: 55 cm  Knee: 44 cm  Upper Calf: 49.5 cm  Mid Calf: 40 cm  Ankle: 31.5 cm  Heel to Foot: 26 cm  Total: 305 cm    Right Lower Extremity Circumferential Measurements  Waist: cm  Hip: cm  Scrotum: cm  Ground/Upper Thigh: 56 cm  Mid Thigh: 52 cm  Knee: 48 cm  Upper Calf: 56.5 cm  Mid Calf: 46 cm  Ankle: 33.5 cm  Heel to Foot: 27.5 cm  Total: 319.5 cm           Left Lower Extremity Circumferential Measurements  Waist: cm  Hip: cm  Scrotum: cm  Ground/Upper Thigh: 61 cm  Mid Thigh: 56.5 cm  Knee: 49 cm  Upper Calf: 50 cm  Mid Calf: 40.5 cm  Ankle: 30.5 cm  Heel to Foot: 26 cm  Total: 313.5 cm     Right Lower Extremity Circumferential Measurements  Waist: cm  Hip: cm  Scrotum: cm  Ground/Upper Thigh: 57 cm  Mid Thigh: 55.5 cm  Knee: 48 cm  Upper Calf: 58 cm  Mid Calf: 47 cm  Ankle: 33.5 cm  Heel to Foot: 26.5 cm  Total: 325.5 cm        Left Lower Extremity Circumferential Measurements 7/16  Waist: cm  Hip: cm  Scrotum: cm  Ground/Upper Thigh: 56 cm  Mid Thigh: 57 cm  Knee: 49.5 cm  Upper Calf: 52 cm  Mid Calf: 46 cm  Ankle: 30 cm  Heel to Foot: 26 cm  Total: 316.5 cm     Right Lower Extremity Circumferential Measurements 7/16  Waist: cm  Hip: cm  Scrotum: cm  Ground/Upper Thigh: 57.5 cm  Mid  Thigh: 55 cm  Knee: 49 cm  Upper Calf: 57.5 cm  Mid Calf: 50 cm  Ankle: 32 cm  Heel to Foot: 26.5 cm  Total: 327.5 cm        Left Lower Extremity Circumferential Measurements 7/14  Ground/Upper Thigh: 58.5 cm  Mid Thigh: 59.5 cm  Knee: 47.5 cm  Upper Calf: 51 cm  Mid Calf: 46 cm  Ankle: 31 cm  Heel to Foot: 25.5 cm  Total: 319 cm     Right Lower Extremity Circumferential Measurements 7/14  Ground/Upper Thigh: 59 cm  Mid Thigh: 55 cm  Knee: 50 cm  Upper Calf: 60 cm  Mid Calf: 52.5 cm  Ankle: 33 cm  Heel to Foot: 27.5 cm  Total: 337 cm       Left Lower Extremity Circumferential Measurements7/12  Ground/Upper Thigh: 60.5 cm  Mid Thigh: 57 cm  Knee: 47 cm  Upper Calf: 53.5 cm  Mid Calf: 45 cm  Ankle: 32 cm  Heel to Foot: 25.5 cm  Total: 320.5 cm     Right Lower Extremity Circumferential Measurements 7/12  Ground/Upper Thigh: 58.5 cm  Mid Thigh: 56.5 cm  Knee: 50 cm  Upper Calf: 60.5 cm  Mid Calf: 47 cm  Ankle: 33 cm  Heel to Foot: 26.5 cm  Total: 332 cm     Left Lower Extremity Circumferential Measurements 7/9  Ground/Upper Thigh: 62.5 cm  Mid Thigh: 58 cm  Knee: 54 cm  Upper Calf: 57.5 cm  Mid Calf: 49.5 cm  Ankle: 33.5 cm  Heel to Foot: 27 cm  Total: 342 cm     Right Lower Extremity Circumferential Measurements 7/9  Ground/Upper Thigh: 60.5 cm  Mid Thigh: 60 cm  Knee: 53.5 cm  Upper Calf: 62 cm  Mid Calf: 57 cm  Ankle: 34.5 cm  Heel to Foot: 27.5 cm  Total: 355 cm    Therapeutic Treatments and Modalities:     1. Manual Therapy (CPT 69617), MLD for L and R anastomoses. Fibrosis work to L proximal thigh    2. Functional Training, Self Care (CPT 53096), See below    Therapeutic Treatment and Modalities Summary: Pt has been instruced on self-MLD with return demo for bilateral AI anastomoses and activation of inguinal node . Handout with instructions provided. Pt has also been instructed with handout on LE compression wrapping beginning with stockinette and padding. He was able to wrap R LE with foam placement. Required  cues to increase pressure as pt with light force of bandage stretch. Educated to moisturize prior to application.     Time-based treatments/modalities:    Physical Therapy Timed Treatment Charges  Functional training, self care minutes (CPT 49740): 60 minutes  Manual therapy minutes (CPT 07626): 18 minutes      Assessment and Plan:   Assessment details:  Pt is continuing to achieve reduction to B LE. From eval, he has reduced on R LE from 355cm to 319.5cm in total circumference. On L LE, pt has reduced from 342 to 305cm. He will require reinforcement for increasing stretch to self-bandage as well as reinforcement for importance of wearing bandage (pt asked if could remove overnight).  Prognosis: fair      Plan:  Therapy options:  Physical therapy treatment to continue  Discussed with:  Patient

## 2021-07-23 ENCOUNTER — PHYSICAL THERAPY (OUTPATIENT)
Dept: PHYSICAL THERAPY | Facility: REHABILITATION | Age: 76
End: 2021-07-23
Attending: ORTHOPAEDIC SURGERY
Payer: MEDICARE

## 2021-07-23 DIAGNOSIS — I89.0 LYMPHEDEMA OF BOTH LOWER EXTREMITIES: ICD-10-CM

## 2021-07-23 PROCEDURE — 97535 SELF CARE MNGMENT TRAINING: CPT

## 2021-07-23 PROCEDURE — 97110 THERAPEUTIC EXERCISES: CPT

## 2021-07-23 PROCEDURE — 97140 MANUAL THERAPY 1/> REGIONS: CPT

## 2021-07-23 NOTE — OP THERAPY DAILY TREATMENT
Outpatient Physical Therapy  LYMPHEDEMA THERAPY DAILY TREATMENT     [facility] Physical Therapy [address]  [address] [facility]  [place] [address]  Phone:  [phone]  Fax:  [phone]    Date: 07/23/2021    Patient: Jimmie Zamudio  YOB: 1945  MRN: [id number]     Time Calculation    Start time: 0900  Stop time: 1026 Time Calculation (min): 86 minutes         Chief Complaint: Lymphedema Congenital    Visit #: 8    Subjective: Pt reporting he took a shower and re-wrapped bandages. Did take him 2hrs to do so.     Lymphedema Objective    Left Lower Extremity Circumferential Measurements  Waist: cm  Hip: cm  Scrotum: cm  Ground/Upper Thigh: 57 cm  Mid Thigh: 51 cm  Knee: 49 cm  Upper Calf: 50.5 cm  Mid Calf: 42.5 cm  Ankle: 30.5 cm  Heel to Foot: 26.5 cm  Total: 307 cm    Right Lower Extremity Circumferential Measurements  Waist: cm  Hip: cm  Scrotum: cm  Ground/Upper Thigh: 56 cm  Mid Thigh: 51.5 cm  Knee: 48 cm  Upper Calf: 57 cm  Mid Calf: 48 cm  Ankle: 34 cm  Heel to Foot: 28.5 cm  Total: 323 cm         Left Lower Extremity Circumferential Measurements 7/21  Ground/Upper Thigh: 59 cm  Mid Thigh: 55 cm  Knee: 44 cm  Upper Calf: 49.5 cm  Mid Calf: 40 cm  Ankle: 31.5 cm  Heel to Foot: 26 cm  Total: 305 cm     Right Lower Extremity Circumferential Measurements 7/21  Ground/Upper Thigh: 56 cm  Mid Thigh: 52 cm  Knee: 48 cm  Upper Calf: 56.5 cm  Mid Calf: 46 cm  Ankle: 33.5 cm  Heel to Foot: 27.5 cm  Total: 319.5 cm             Left Lower Extremity Circumferential Measurements  Waist: cm  Hip: cm  Scrotum: cm  Ground/Upper Thigh: 61 cm  Mid Thigh: 56.5 cm  Knee: 49 cm  Upper Calf: 50 cm  Mid Calf: 40.5 cm  Ankle: 30.5 cm  Heel to Foot: 26 cm  Total: 313.5 cm     Right Lower Extremity Circumferential Measurements  Waist: cm  Hip: cm  Scrotum: cm  Ground/Upper Thigh: 57 cm  Mid Thigh: 55.5 cm  Knee: 48 cm  Upper Calf: 58 cm  Mid Calf: 47 cm  Ankle: 33.5 cm  Heel to  Foot: 26.5 cm  Total: 325.5 cm        Left Lower Extremity Circumferential Measurements 7/16  Waist: cm  Hip: cm  Scrotum: cm  Ground/Upper Thigh: 56 cm  Mid Thigh: 57 cm  Knee: 49.5 cm  Upper Calf: 52 cm  Mid Calf: 46 cm  Ankle: 30 cm  Heel to Foot: 26 cm  Total: 316.5 cm     Right Lower Extremity Circumferential Measurements 7/16  Waist: cm  Hip: cm  Scrotum: cm  Ground/Upper Thigh: 57.5 cm  Mid Thigh: 55 cm  Knee: 49 cm  Upper Calf: 57.5 cm  Mid Calf: 50 cm  Ankle: 32 cm  Heel to Foot: 26.5 cm  Total: 327.5 cm        Left Lower Extremity Circumferential Measurements 7/14  Ground/Upper Thigh: 58.5 cm  Mid Thigh: 59.5 cm  Knee: 47.5 cm  Upper Calf: 51 cm  Mid Calf: 46 cm  Ankle: 31 cm  Heel to Foot: 25.5 cm  Total: 319 cm     Right Lower Extremity Circumferential Measurements 7/14  Ground/Upper Thigh: 59 cm  Mid Thigh: 55 cm  Knee: 50 cm  Upper Calf: 60 cm  Mid Calf: 52.5 cm  Ankle: 33 cm  Heel to Foot: 27.5 cm  Total: 337 cm       Left Lower Extremity Circumferential Measurements7/12  Ground/Upper Thigh: 60.5 cm  Mid Thigh: 57 cm  Knee: 47 cm  Upper Calf: 53.5 cm  Mid Calf: 45 cm  Ankle: 32 cm  Heel to Foot: 25.5 cm  Total: 320.5 cm     Right Lower Extremity Circumferential Measurements 7/12  Ground/Upper Thigh: 58.5 cm  Mid Thigh: 56.5 cm  Knee: 50 cm  Upper Calf: 60.5 cm  Mid Calf: 47 cm  Ankle: 33 cm  Heel to Foot: 26.5 cm  Total: 332 cm     Left Lower Extremity Circumferential Measurements 7/9  Ground/Upper Thigh: 62.5 cm  Mid Thigh: 58 cm  Knee: 54 cm  Upper Calf: 57.5 cm  Mid Calf: 49.5 cm  Ankle: 33.5 cm  Heel to Foot: 27 cm  Total: 342 cm     Right Lower Extremity Circumferential Measurements 7/9  Ground/Upper Thigh: 60.5 cm  Mid Thigh: 60 cm  Knee: 53.5 cm  Upper Calf: 62 cm  Mid Calf: 57 cm  Ankle: 34.5 cm  Heel to Foot: 27.5 cm  Total: 355 cm    Therapeutic Exercises (CPT 90925):     1. Ankle pumps, LAQ, unilateral mini squat, 5x2-3reps at a time to R and L LE, with and without bandages.     Therapeutic  "Treatments and Modalities:     1. Manual Therapy (CPT 28886), MLD to R LE for bilateral LE without trunk involvement    2. Functional Training, Self Care (CPT 51574), Verbally reviewed importance of increasing force of compression for home wrapping. Encouraged pt to keep compression on until morning prior to visit in order to shower etc but not allow for increased fluid buildup. Pt reporting he has been completing self MLD for bilateral AI.     Applied multi-layer short stretch compression bandages to B LE. R distal LE with Komprex foam application to reduce fibrosis. Chip bags applied to proximal medial thighs on R and L as well as to distal medial thigh on L. 1/2\" grey foam applied to distal L LE to reduce fibrosis and facilitate cylindrical drainage.     Time-based treatments/modalities:    Physical Therapy Timed Treatment Charges  Functional training, self care minutes (CPT 59586): 11 minutes  Manual therapy minutes (CPT 42545): 34 minutes  Therapeutic exercise minutes (CPT 82414): 9 minutes      Assessment and Plan:   Assessment details:  Pt was able to wrap his own LE, however, did not appear to achieve adequate compression and subsequently had an increase in swelling to B LE. He is consistently performing LE exercises and is being active in yard to facilitate muscle pump, but will require ongoing MLD and compression wraps at appropriate compression force to achieve reduction.     Plan:  Therapy options:  Physical therapy treatment to continue  Discussed with:  Patient and family           "

## 2021-07-26 ENCOUNTER — PHYSICAL THERAPY (OUTPATIENT)
Dept: PHYSICAL THERAPY | Facility: REHABILITATION | Age: 76
End: 2021-07-26
Attending: ORTHOPAEDIC SURGERY
Payer: MEDICARE

## 2021-07-26 DIAGNOSIS — I89.0 LYMPHEDEMA OF BOTH LOWER EXTREMITIES: ICD-10-CM

## 2021-07-26 PROCEDURE — 97140 MANUAL THERAPY 1/> REGIONS: CPT

## 2021-07-26 PROCEDURE — 97535 SELF CARE MNGMENT TRAINING: CPT

## 2021-07-26 NOTE — OP THERAPY DAILY TREATMENT
Outpatient Physical Therapy  LYMPHEDEMA THERAPY DAILY TREATMENT     [facility] Physical Therapy [address].  [address]  [place] [address]  Phone:  [phone]  Fax:  [phone]    Date: 07/26/2021    Patient: Jimmie Zamudio  YOB: 1945  MRN: [id number]     Time Calculation    Start time: 0901  Stop time: 1035 Time Calculation (min): 94 minutes         Chief Complaint: Lymphedema Congenital    Visit #: 9    Subjective:   History of Present Illness:     Mechanism of injury:  Pt reports he was able to keep bandages on all weekend but chip bag to L medial thigh slipped. Verbalized feeling tired of driving to these appointments/duration. Is pleased with reduction so far, however.       Lymphedema Objective    Left Lower Extremity Circumferential Measurements  Waist: cm  Hip: cm  Scrotum: cm  Ground/Upper Thigh: 56.5 cm  Mid Thigh: 49.5 cm  Knee: 48.5 cm  Upper Calf: 48 cm  Mid Calf: 45 cm  Ankle: 29 cm  Heel to Foot: 25.5 cm  Total: 302 cm    Right Lower Extremity Circumferential Measurements  Waist: cm  Hip: cm  Scrotum: cm  Ground/Upper Thigh: 55.5 cm  Mid Thigh: 52 cm  Knee: 47.5 cm  Upper Calf: 56.5 cm  Mid Calf: 47 cm  Ankle: 33 cm  Heel to Foot: 27.5 cm  Total: 319 cm         Left Lower Extremity Circumferential Measurements 7/23  Waist: cm  Hip: cm  Scrotum: cm  Ground/Upper Thigh: 57 cm  Mid Thigh: 51 cm  Knee: 49 cm  Upper Calf: 50.5 cm  Mid Calf: 42.5 cm  Ankle: 30.5 cm  Heel to Foot: 26.5 cm  Total: 307 cm     Right Lower Extremity Circumferential Measurements 7/23  Waist: cm  Hip: cm  Scrotum: cm  Ground/Upper Thigh: 56 cm  Mid Thigh: 51.5 cm  Knee: 48 cm  Upper Calf: 57 cm  Mid Calf: 48 cm  Ankle: 34 cm  Heel to Foot: 28.5 cm  Total: 323 cm           Left Lower Extremity Circumferential Measurements 7/21  Ground/Upper Thigh: 59 cm  Mid Thigh: 55 cm  Knee: 44 cm  Upper Calf: 49.5 cm  Mid Calf: 40 cm  Ankle: 31.5 cm  Heel to Foot: 26 cm  Total: 305 cm     Right Lower  Extremity Circumferential Measurements 7/21  Ground/Upper Thigh: 56 cm  Mid Thigh: 52 cm  Knee: 48 cm  Upper Calf: 56.5 cm  Mid Calf: 46 cm  Ankle: 33.5 cm  Heel to Foot: 27.5 cm  Total: 319.5 cm             Left Lower Extremity Circumferential Measurements  Waist: cm  Hip: cm  Scrotum: cm  Ground/Upper Thigh: 61 cm  Mid Thigh: 56.5 cm  Knee: 49 cm  Upper Calf: 50 cm  Mid Calf: 40.5 cm  Ankle: 30.5 cm  Heel to Foot: 26 cm  Total: 313.5 cm     Right Lower Extremity Circumferential Measurements  Waist: cm  Hip: cm  Scrotum: cm  Ground/Upper Thigh: 57 cm  Mid Thigh: 55.5 cm  Knee: 48 cm  Upper Calf: 58 cm  Mid Calf: 47 cm  Ankle: 33.5 cm  Heel to Foot: 26.5 cm  Total: 325.5 cm        Left Lower Extremity Circumferential Measurements 7/16  Waist: cm  Hip: cm  Scrotum: cm  Ground/Upper Thigh: 56 cm  Mid Thigh: 57 cm  Knee: 49.5 cm  Upper Calf: 52 cm  Mid Calf: 46 cm  Ankle: 30 cm  Heel to Foot: 26 cm  Total: 316.5 cm     Right Lower Extremity Circumferential Measurements 7/16  Waist: cm  Hip: cm  Scrotum: cm  Ground/Upper Thigh: 57.5 cm  Mid Thigh: 55 cm  Knee: 49 cm  Upper Calf: 57.5 cm  Mid Calf: 50 cm  Ankle: 32 cm  Heel to Foot: 26.5 cm  Total: 327.5 cm        Left Lower Extremity Circumferential Measurements 7/14  Ground/Upper Thigh: 58.5 cm  Mid Thigh: 59.5 cm  Knee: 47.5 cm  Upper Calf: 51 cm  Mid Calf: 46 cm  Ankle: 31 cm  Heel to Foot: 25.5 cm  Total: 319 cm     Right Lower Extremity Circumferential Measurements 7/14  Ground/Upper Thigh: 59 cm  Mid Thigh: 55 cm  Knee: 50 cm  Upper Calf: 60 cm  Mid Calf: 52.5 cm  Ankle: 33 cm  Heel to Foot: 27.5 cm  Total: 337 cm       Left Lower Extremity Circumferential Measurements7/12  Ground/Upper Thigh: 60.5 cm  Mid Thigh: 57 cm  Knee: 47 cm  Upper Calf: 53.5 cm  Mid Calf: 45 cm  Ankle: 32 cm  Heel to Foot: 25.5 cm  Total: 320.5 cm     Right Lower Extremity Circumferential Measurements 7/12  Ground/Upper Thigh: 58.5 cm  Mid Thigh: 56.5 cm  Knee: 50 cm  Upper  Calf: 60.5 cm  Mid Calf: 47 cm  Ankle: 33 cm  Heel to Foot: 26.5 cm  Total: 332 cm     Left Lower Extremity Circumferential Measurements 7/9  Ground/Upper Thigh: 62.5 cm  Mid Thigh: 58 cm  Knee: 54 cm  Upper Calf: 57.5 cm  Mid Calf: 49.5 cm  Ankle: 33.5 cm  Heel to Foot: 27 cm  Total: 342 cm     Right Lower Extremity Circumferential Measurements 7/9  Ground/Upper Thigh: 60.5 cm  Mid Thigh: 60 cm  Knee: 53.5 cm  Upper Calf: 62 cm  Mid Calf: 57 cm  Ankle: 34.5 cm  Heel to Foot: 27.5 cm  Total: 355 cm       Therapeutic Treatments and Modalities:     1. Manual Therapy (CPT 82556), MLD to R LE utilizing B LE without trunk involvement sequence. , utilized fibrosis technique to R distal LE and proximal medial L LE.     2. Functional Training, Self Care (CPT 72265), Discussed POC and home bandaging. Pt reports uncertainty of wanting to continue visits.     Time-based treatments/modalities:    Physical Therapy Timed Treatment Charges  Functional training, self care minutes (CPT 74337): 35 minutes  Manual therapy minutes (CPT 82058): 24 minutes      Assessment and Plan:   Assessment details:  Pt continues to achieve reduction to total circumference of B LE. His R LE has reduced from 355cm upon eval to 319cm today. L LE has reduced from 342cm upon eval to 302cm today. He has expressed feeling the frequency of appointments are wearing on him, however, he is not yet competent with self-bandaging and is unable to physically perform MLD to LE other than bilateral AI.   Prognosis: fair      Plan:  Therapy options:  Physical therapy treatment to continue  Discussed with:  Patient and family

## 2021-07-28 ENCOUNTER — PHYSICAL THERAPY (OUTPATIENT)
Dept: PHYSICAL THERAPY | Facility: REHABILITATION | Age: 76
End: 2021-07-28
Attending: ORTHOPAEDIC SURGERY
Payer: MEDICARE

## 2021-07-28 DIAGNOSIS — I89.0 LYMPHEDEMA OF BOTH LOWER EXTREMITIES: ICD-10-CM

## 2021-07-28 PROCEDURE — 97535 SELF CARE MNGMENT TRAINING: CPT

## 2021-07-28 PROCEDURE — 97140 MANUAL THERAPY 1/> REGIONS: CPT

## 2021-07-28 NOTE — OP THERAPY PROGRESS SUMMARY
Outpatient Physical Therapy  LYMPHEDEMA THERAPY PROGRESS SUMMARY NOTE    [facility] [address].  [address]  [facility] [address]  Phone:  [phone]  Fax:  [phone]    Date of Visit: 07/28/2021    Patient: Jimmie Zamudio  YOB: 1945  MRN: [id number]     Referring Provider: Suhas Samson M.D.  [facility] [place] [address]   Referring Diagnosis Presence of right artificial knee joint [Z96.651]     Visit #: Visit count could not be calculated. Make sure you are using a visit which is associated with an episode.    Progress Report Period: 7/9/21-7/28/21    Time Calculation    Start time: 0901  Stop time: 1030 Time Calculation (min): 89 minutes         Chief Complaint: Lymphedema Congenital    Visit Diagnoses     ICD-10-CM   1. Lymphedema of both lower extremities  I89.0       Subjective    Lymphedema Objective   Left Lower Extremity Circumferential Measurements 7/28  Ground/Upper Thigh: 55 cm  Mid Thigh: 51.5 cm  Knee: 45.5 cm  Upper Calf: 49.5 cm  Mid Calf: 40 cm  Ankle: 30.5 cm  Heel to Foot: 25 cm  Total: 297 cm     Right Lower Extremity Circumferential Measurements 7/28  Ground/Upper Thigh: 56.5 cm  Mid Thigh: 50 cm  Knee: 47 cm  Upper Calf: 55.5 cm  Mid Calf: 47 cm  Ankle: 33.5 cm  Heel to Foot: 26 cm  Total: 315.5 cm    Left Lower Extremity Circumferential Measurements 7/9  Ground/Upper Thigh: 62.5 cm  Mid Thigh: 58 cm  Knee: 54 cm  Upper Calf: 57.5 cm  Mid Calf: 49.5 cm  Ankle: 33.5 cm  Heel to Foot: 27 cm  Total: 342 cm     Right Lower Extremity Circumferential Measurements 7/9  Ground/Upper Thigh: 60.5 cm  Mid Thigh: 60 cm  Knee: 53.5 cm  Upper Calf: 62 cm  Mid Calf: 57 cm  Ankle: 34.5 cm  Heel to Foot: 27.5 cm  Total: 355 cm       Exercises/Treatment   1. Functional Training, Self Care (CPT 28415), Discussed POC including anticipating measurements for garments, self-MLD for bilateral IA anastomoses, pt is eager to cease treatments and  obtain wraps.     2. Manual Therapy (CPT 59016), MLD for B LE lymphedema without trunk involvement. Focus on R LE    Time-based treatments/modalities:    Physical Therapy Timed Treatment Charges  Functional training, self care minutes (CPT 39507): 29 minutes  Manual therapy minutes (CPT 89991): 24 minutes      Assessment and Plan:     Goals:   Short Term Goals:   1. Pt will demonstrate self-bandaging with min A to reduce recurrence in swelling.   2. Pt will be independent with abdominal breathing.   3. Pt will have a reduction in R limb total girth by 1cm.  Patient progress towards short term goals:  1. Goal Met  2.Goal Met  3.Goal met    Long Term Goals:  1. Pt will be independent with donning compression garments.   2. Pt will be independent with self-bandaging to reduce recurrence in swelling.   3. Pt will by independent with HEP.   Patient progress towards Long Term goals:  1. Goal not met.  2.Goal not met.  3. Goal met.     Plan:  Therapy options:  Physical therapy treatment to continue  Planned therapy interventions:  Addressing equipment needs, caregiver education, compression bandaging, compression stocking, decongestive exercises, gait training (CPT 69906), home exercise program, intermittent compression, manual lymph drainage, myofascial release techniques, orthotic measurements/fitting, patient education, postural exercises, range of motion exercises, referral for compression garment & instructions for don/doffing, self-care/training (CPT 94121), short stretch bandages, sequential compression pump, skin/wound care, soft tissue manual techniques (CPT 17999), strengthening exercises and Velcro wraps  Planned education:  Functional anatomy and physiology of the lymphatic system, lymphedema exercise, pathophysiology of lymphedema, lymphedema precautions, proper skin care/nutrition, compression bandaging, self massage, infection prevention, scar tissue management, activity guidelines, dietary guidelines, skin  care guidelines, bandage removal, long term self-management of lymphedema and home pump use  Frequency:  3x week  Duration in weeks:  8  Discussed with:  Patient and family  Plan details:  Pt is interested in transitioning to garments/wraps at this time. Anticipate this in next few weeks as timeline for custom garment likely 14days out for manufacturing. As pt not yet proficient in self-bandages, will benefit from further PT intervention for CDT and review of force required for compression (at times difficult as pt unable to tolerate full 50% stretch).      Pt continues to achieve B LE reduction in total limb circumference. Upon evaluation, his right LE total circumference was 355cm and left 342cm. Today, R 315.5 and L LE 297cm. Tissue quality at proximal aspect of bilateral LE has also improved such that there is a small area to L LE that was addressed today and will likely disappear this next session due to [employer] application. Pt will benefit from additional visits to complete intensive phase of CDT and transition into self-management phase. Anticipate measurements for garments this week as pt has verbalized interest in independence before plateau of measurements.     Functional Assessment Used        Referring provider co-signature:  I have reviewed this plan of care and my co-signature certifies the need for services.    Certification Period: 07/28/2021 to 09/22/21    Physician Signature: ________________________________ Date: ______________

## 2021-07-28 NOTE — OP THERAPY DAILY TREATMENT
Outpatient Physical Therapy  LYMPHEDEMA THERAPY DAILY TREATMENT     [facility] Therapy [address].  Suite [facility][note 3]  [place] [address]  Phone:  [phone]  Fax:  [phone]    Date: 07/28/2021    Patient: Jimmie Zamudio  YOB: 1945  MRN: [id number]     Time Calculation    Start time: 0901  Stop time: 1030 Time Calculation (min): 89 minutes         Chief Complaint: Lymphedema Congenital    Visit #: 10    Subjective:   History of Present Illness:     Mechanism of injury:  Pt reporting bandages held up without issue.       Lymphedema Objective    Left Lower Extremity Circumferential Measurements  Waist: cm  Hip: cm  Scrotum: cm  Ground/Upper Thigh: 55 cm  Mid Thigh: 51.5 cm  Knee: 45.5 cm  Upper Calf: 49.5 cm  Mid Calf: 40 cm  Ankle: 30.5 cm  Heel to Foot: 25 cm  Total: 297 cm    Right Lower Extremity Circumferential Measurements  Waist: cm  Hip: cm  Scrotum: cm  Ground/Upper Thigh: 56.5 cm  Mid Thigh: 50 cm  Knee: 47 cm  Upper Calf: 55.5 cm  Mid Calf: 47 cm  Ankle: 33.5 cm  Heel to Foot: 26 cm  Total: 315.5 cm           Left Lower Extremity Circumferential Measurements  Waist: cm  Hip: cm  Scrotum: cm  Ground/Upper Thigh: 56.5 cm  Mid Thigh: 49.5 cm  Knee: 48.5 cm  Upper Calf: 48 cm  Mid Calf: 45 cm  Ankle: 29 cm  Heel to Foot: 25.5 cm  Total: 302 cm     Right Lower Extremity Circumferential Measurements  Waist: cm  Hip: cm  Scrotum: cm  Ground/Upper Thigh: 55.5 cm  Mid Thigh: 52 cm  Knee: 47.5 cm  Upper Calf: 56.5 cm  Mid Calf: 47 cm  Ankle: 33 cm  Heel to Foot: 27.5 cm  Total: 319 cm           Left Lower Extremity Circumferential Measurements 7/23  Waist: cm  Hip: cm  Scrotum: cm  Ground/Upper Thigh: 57 cm  Mid Thigh: 51 cm  Knee: 49 cm  Upper Calf: 50.5 cm  Mid Calf: 42.5 cm  Ankle: 30.5 cm  Heel to Foot: 26.5 cm  Total: 307 cm     Right Lower Extremity Circumferential Measurements 7/23  Waist: cm  Hip: cm  Scrotum: cm  Ground/Upper Thigh: 56 cm  Mid  Thigh: 51.5 cm  Knee: 48 cm  Upper Calf: 57 cm  Mid Calf: 48 cm  Ankle: 34 cm  Heel to Foot: 28.5 cm  Total: 323 cm           Left Lower Extremity Circumferential Measurements 7/21  Ground/Upper Thigh: 59 cm  Mid Thigh: 55 cm  Knee: 44 cm  Upper Calf: 49.5 cm  Mid Calf: 40 cm  Ankle: 31.5 cm  Heel to Foot: 26 cm  Total: 305 cm     Right Lower Extremity Circumferential Measurements 7/21  Ground/Upper Thigh: 56 cm  Mid Thigh: 52 cm  Knee: 48 cm  Upper Calf: 56.5 cm  Mid Calf: 46 cm  Ankle: 33.5 cm  Heel to Foot: 27.5 cm  Total: 319.5 cm             Left Lower Extremity Circumferential Measurements  Waist: cm  Hip: cm  Scrotum: cm  Ground/Upper Thigh: 61 cm  Mid Thigh: 56.5 cm  Knee: 49 cm  Upper Calf: 50 cm  Mid Calf: 40.5 cm  Ankle: 30.5 cm  Heel to Foot: 26 cm  Total: 313.5 cm     Right Lower Extremity Circumferential Measurements  Waist: cm  Hip: cm  Scrotum: cm  Ground/Upper Thigh: 57 cm  Mid Thigh: 55.5 cm  Knee: 48 cm  Upper Calf: 58 cm  Mid Calf: 47 cm  Ankle: 33.5 cm  Heel to Foot: 26.5 cm  Total: 325.5 cm        Left Lower Extremity Circumferential Measurements 7/16  Waist: cm  Hip: cm  Scrotum: cm  Ground/Upper Thigh: 56 cm  Mid Thigh: 57 cm  Knee: 49.5 cm  Upper Calf: 52 cm  Mid Calf: 46 cm  Ankle: 30 cm  Heel to Foot: 26 cm  Total: 316.5 cm     Right Lower Extremity Circumferential Measurements 7/16  Waist: cm  Hip: cm  Scrotum: cm  Ground/Upper Thigh: 57.5 cm  Mid Thigh: 55 cm  Knee: 49 cm  Upper Calf: 57.5 cm  Mid Calf: 50 cm  Ankle: 32 cm  Heel to Foot: 26.5 cm  Total: 327.5 cm        Left Lower Extremity Circumferential Measurements 7/14  Ground/Upper Thigh: 58.5 cm  Mid Thigh: 59.5 cm  Knee: 47.5 cm  Upper Calf: 51 cm  Mid Calf: 46 cm  Ankle: 31 cm  Heel to Foot: 25.5 cm  Total: 319 cm     Right Lower Extremity Circumferential Measurements 7/14  Ground/Upper Thigh: 59 cm  Mid Thigh: 55 cm  Knee: 50 cm  Upper Calf: 60 cm  Mid Calf: 52.5 cm  Ankle: 33 cm  Heel to Foot: 27.5 cm  Total: 337 cm       Left  Lower Extremity Circumferential Measurements7/12  Ground/Upper Thigh: 60.5 cm  Mid Thigh: 57 cm  Knee: 47 cm  Upper Calf: 53.5 cm  Mid Calf: 45 cm  Ankle: 32 cm  Heel to Foot: 25.5 cm  Total: 320.5 cm     Right Lower Extremity Circumferential Measurements 7/12  Ground/Upper Thigh: 58.5 cm  Mid Thigh: 56.5 cm  Knee: 50 cm  Upper Calf: 60.5 cm  Mid Calf: 47 cm  Ankle: 33 cm  Heel to Foot: 26.5 cm  Total: 332 cm     Left Lower Extremity Circumferential Measurements 7/9  Ground/Upper Thigh: 62.5 cm  Mid Thigh: 58 cm  Knee: 54 cm  Upper Calf: 57.5 cm  Mid Calf: 49.5 cm  Ankle: 33.5 cm  Heel to Foot: 27 cm  Total: 342 cm     Right Lower Extremity Circumferential Measurements 7/9  Ground/Upper Thigh: 60.5 cm  Mid Thigh: 60 cm  Knee: 53.5 cm  Upper Calf: 62 cm  Mid Calf: 57 cm  Ankle: 34.5 cm  Heel to Foot: 27.5 cm  Total: 355 cm      Therapeutic Treatments and Modalities:     1. Functional Training, Self Care (CPT 04846), Discussed POC including anticipating measurements for garments, self-MLD for bilateral IA anastomoses, pt is eager to cease treatments and obtain wraps.     2. Manual Therapy (CPT 51168), MLD for B LE lymphedema without trunk involvement. Focus on R LE.     Time-based treatments/modalities:    Physical Therapy Timed Treatment Charges  Functional training, self care minutes (CPT 61938): 29 minutes  Manual therapy minutes (CPT 58358): 24 minutes      Assessment and Plan:   Assessment details:  Pt continues to achieve B LE reduction in total limb circumference. Upon evaluation, his right LE total circumference was 355cm and left 342cm. Today, R 315.5 and L LE 297cm. Tissue quality at proximal aspect of bilateral LE has also improved such that there is a small area to L LE that was addressed today and will likely disappear this next session due to [employer] application. Pt will benefit from additional visits to complete intensive phase of CDT and transition into self-management phase. Anticipate measurements  for tremayne this week as pt has verbalized interest in independence before plateau of measurements.     Plan:  Therapy options:  Physical therapy treatment to continue  Discussed with:  Patient and family

## 2021-07-30 ENCOUNTER — PHYSICAL THERAPY (OUTPATIENT)
Dept: PHYSICAL THERAPY | Facility: REHABILITATION | Age: 76
End: 2021-07-30
Attending: ORTHOPAEDIC SURGERY
Payer: MEDICARE

## 2021-07-30 DIAGNOSIS — I89.0 LYMPHEDEMA OF BOTH LOWER EXTREMITIES: ICD-10-CM

## 2021-07-30 PROCEDURE — 97535 SELF CARE MNGMENT TRAINING: CPT

## 2021-07-30 NOTE — OP THERAPY DAILY TREATMENT
Outpatient Physical Therapy  LYMPHEDEMA THERAPY DAILY TREATMENT     [facility] Therapy [address]  [address]  [place] [address]  Phone:  [phone]  Fax:  [phone]    Date: 07/30/2021    Patient: Jimmie Zamudio  YOB: 1945  MRN: [id number]     Time Calculation    Start time: 0902  Stop time: 1030 Time Calculation (min): 88 minutes         Chief Complaint: Lymphedema Congenital    Visit #: 11    Subjective:   History of Present Illness:     Mechanism of injury:  Pt reporting interest in ordering wraps today. No issues with bandages other than R lateral kidney placement felt irritating during driving. Requested to avoid kidney today.       Lymphedema Objective    Left Lower Extremity Circumferential Measurements  Waist: cm  Hip: cm  Scrotum: cm  Ground/Upper Thigh: 56.5 cm  Mid Thigh: 49.5 cm  Knee: 47 cm  Upper Calf: 48 cm  Mid Calf: 39 cm  Ankle: 29.5 cm  Heel to Foot: 25.5 cm  Total: 295 cm    Right Lower Extremity Circumferential Measurements  Waist: cm  Hip: cm  Scrotum: cm  Ground/Upper Thigh: 54.5 cm  Mid Thigh: 50 cm  Knee: 47 cm  Upper Calf: 54 cm  Mid Calf: 46.5 cm  Ankle: 32.5 cm  Heel to Foot: 27 cm  Total: 311.5 cm       Left Lower Extremity Circumferential Measurements 7/28  Ground/Upper Thigh: 55 cm  Mid Thigh: 51.5 cm  Knee: 45.5 cm  Upper Calf: 49.5 cm  Mid Calf: 40 cm  Ankle: 30.5 cm  Heel to Foot: 25 cm  Total: 297 cm     Right Lower Extremity Circumferential Measurements 7/28  Ground/Upper Thigh: 56.5 cm  Mid Thigh: 50 cm  Knee: 47 cm  Upper Calf: 55.5 cm  Mid Calf: 47 cm  Ankle: 33.5 cm  Heel to Foot: 26 cm  Total: 315.5 cm        Left Lower Extremity Circumferential Measurements 7/26  Waist: cm  Hip: cm  Scrotum: cm  Ground/Upper Thigh: 56.5 cm  Mid Thigh: 49.5 cm  Knee: 48.5 cm  Upper Calf: 48 cm  Mid Calf: 45 cm  Ankle: 29 cm  Heel to Foot: 25.5 cm  Total: 302 cm     Right Lower Extremity Circumferential Measurements 7/26  Waist: cm  Hip:  cm  Scrotum: cm  Ground/Upper Thigh: 55.5 cm  Mid Thigh: 52 cm  Knee: 47.5 cm  Upper Calf: 56.5 cm  Mid Calf: 47 cm  Ankle: 33 cm  Heel to Foot: 27.5 cm  Total: 319 cm           Left Lower Extremity Circumferential Measurements 7/23  Waist: cm  Hip: cm  Scrotum: cm  Ground/Upper Thigh: 57 cm  Mid Thigh: 51 cm  Knee: 49 cm  Upper Calf: 50.5 cm  Mid Calf: 42.5 cm  Ankle: 30.5 cm  Heel to Foot: 26.5 cm  Total: 307 cm     Right Lower Extremity Circumferential Measurements 7/23  Waist: cm  Hip: cm  Scrotum: cm  Ground/Upper Thigh: 56 cm  Mid Thigh: 51.5 cm  Knee: 48 cm  Upper Calf: 57 cm  Mid Calf: 48 cm  Ankle: 34 cm  Heel to Foot: 28.5 cm  Total: 323 cm           Left Lower Extremity Circumferential Measurements 7/21  Ground/Upper Thigh: 59 cm  Mid Thigh: 55 cm  Knee: 44 cm  Upper Calf: 49.5 cm  Mid Calf: 40 cm  Ankle: 31.5 cm  Heel to Foot: 26 cm  Total: 305 cm     Right Lower Extremity Circumferential Measurements 7/21  Ground/Upper Thigh: 56 cm  Mid Thigh: 52 cm  Knee: 48 cm  Upper Calf: 56.5 cm  Mid Calf: 46 cm  Ankle: 33.5 cm  Heel to Foot: 27.5 cm  Total: 319.5 cm             Left Lower Extremity Circumferential Measurements  Waist: cm  Hip: cm  Scrotum: cm  Ground/Upper Thigh: 61 cm  Mid Thigh: 56.5 cm  Knee: 49 cm  Upper Calf: 50 cm  Mid Calf: 40.5 cm  Ankle: 30.5 cm  Heel to Foot: 26 cm  Total: 313.5 cm     Right Lower Extremity Circumferential Measurements  Waist: cm  Hip: cm  Scrotum: cm  Ground/Upper Thigh: 57 cm  Mid Thigh: 55.5 cm  Knee: 48 cm  Upper Calf: 58 cm  Mid Calf: 47 cm  Ankle: 33.5 cm  Heel to Foot: 26.5 cm  Total: 325.5 cm        Left Lower Extremity Circumferential Measurements 7/16  Waist: cm  Hip: cm  Scrotum: cm  Ground/Upper Thigh: 56 cm  Mid Thigh: 57 cm  Knee: 49.5 cm  Upper Calf: 52 cm  Mid Calf: 46 cm  Ankle: 30 cm  Heel to Foot: 26 cm  Total: 316.5 cm     Right Lower Extremity Circumferential Measurements 7/16  Waist: cm  Hip: cm  Scrotum: cm  Ground/Upper Thigh: 57.5 cm  Mid  Thigh: 55 cm  Knee: 49 cm  Upper Calf: 57.5 cm  Mid Calf: 50 cm  Ankle: 32 cm  Heel to Foot: 26.5 cm  Total: 327.5 cm        Left Lower Extremity Circumferential Measurements 7/14  Ground/Upper Thigh: 58.5 cm  Mid Thigh: 59.5 cm  Knee: 47.5 cm  Upper Calf: 51 cm  Mid Calf: 46 cm  Ankle: 31 cm  Heel to Foot: 25.5 cm  Total: 319 cm     Right Lower Extremity Circumferential Measurements 7/14  Ground/Upper Thigh: 59 cm  Mid Thigh: 55 cm  Knee: 50 cm  Upper Calf: 60 cm  Mid Calf: 52.5 cm  Ankle: 33 cm  Heel to Foot: 27.5 cm  Total: 337 cm       Left Lower Extremity Circumferential Measurements7/12  Ground/Upper Thigh: 60.5 cm  Mid Thigh: 57 cm  Knee: 47 cm  Upper Calf: 53.5 cm  Mid Calf: 45 cm  Ankle: 32 cm  Heel to Foot: 25.5 cm  Total: 320.5 cm     Right Lower Extremity Circumferential Measurements 7/12  Ground/Upper Thigh: 58.5 cm  Mid Thigh: 56.5 cm  Knee: 50 cm  Upper Calf: 60.5 cm  Mid Calf: 47 cm  Ankle: 33 cm  Heel to Foot: 26.5 cm  Total: 332 cm     Left Lower Extremity Circumferential Measurements 7/9  Ground/Upper Thigh: 62.5 cm  Mid Thigh: 58 cm  Knee: 54 cm  Upper Calf: 57.5 cm  Mid Calf: 49.5 cm  Ankle: 33.5 cm  Heel to Foot: 27 cm  Total: 342 cm     Right Lower Extremity Circumferential Measurements 7/9  Ground/Upper Thigh: 60.5 cm  Mid Thigh: 60 cm  Knee: 53.5 cm  Upper Calf: 62 cm  Mid Calf: 57 cm  Ankle: 34.5 cm  Heel to Foot: 27.5 cm  Total: 355 cm             Therapeutic Treatments and Modalities:     1. Functional Training, Self Care (CPT 34483), Assisted pt in measuring B LE for compression wraps. Ordered wraps for pt.     Multi-layer compression bandages applied for B LE. Pt declined foam or kidney placement today.     Time-based treatments/modalities:    Physical Therapy Timed Treatment Charges  Functional training, self care minutes (CPT 23611): 63 minutes      Assessment and Plan:   Assessment details:  Pt continues to achieve reduction in B LE limb circumference. He feels weary regarding his  frequent visits and today was assisted in ordering B LE wraps. Anticipate 1-2more visits to ensure adequate compression while awaiting wraps to arrive.   Prognosis: fair      Plan:  Therapy options:  Physical therapy treatment to continue  Discussed with:  Patient and family

## 2021-08-03 ENCOUNTER — PHYSICAL THERAPY (OUTPATIENT)
Dept: PHYSICAL THERAPY | Facility: REHABILITATION | Age: 76
End: 2021-08-03
Attending: ORTHOPAEDIC SURGERY
Payer: MEDICARE

## 2021-08-03 DIAGNOSIS — I89.0 LYMPHEDEMA OF BOTH LOWER EXTREMITIES: ICD-10-CM

## 2021-08-03 PROCEDURE — 29581 APPL MULTLAYER CMPRN SYS LEG: CPT | Mod: 50

## 2021-08-03 PROCEDURE — 97140 MANUAL THERAPY 1/> REGIONS: CPT | Mod: 59

## 2021-08-03 PROCEDURE — 97535 SELF CARE MNGMENT TRAINING: CPT | Mod: XU

## 2021-08-03 NOTE — OP THERAPY DAILY TREATMENT
Outpatient Physical Therapy  LYMPHEDEMA THERAPY DAILY TREATMENT     [facility] Physical Therapy [address].  [address]  [place] [address]  Phone:  [phone]  Fax:  [phone]    Date: 08/03/2021    Patient: Jimmie Zamudio  YOB: 1945  MRN: [id number]     Time Calculation    Start time: 1449  Stop time: 1600 Time Calculation (min): 71 minutes         Chief Complaint: Lymphedema Congenital    Visit #: 12    Subjective:   History of Present Illness:     Mechanism of injury:  Pt reporting his wraps are to arrive this Thursday. He was able to go to the gym and ride the stationary bicycle. Intends to go to the gym three times this week.       Lymphedema Objective    Left Lower Extremity Circumferential Measurements  Waist: cm  Hip: cm  Scrotum: cm  Ground/Upper Thigh: 56 cm  Mid Thigh: 48 cm  Knee: 43 cm  Upper Calf: 46 cm  Mid Calf: 40 cm  Ankle: 29 cm  Heel to Foot: 25.5 cm  Total: 287.5 cm    Right Lower Extremity Circumferential Measurements  Waist: cm  Hip: cm  Scrotum: cm  Ground/Upper Thigh: 54 cm  Mid Thigh: 50 cm  Knee: 46 cm  Upper Calf: 54 cm  Mid Calf: 46 cm  Ankle: 34 cm  Heel to Foot: 26 cm  Total: 310 cm            Left Lower Extremity Circumferential Measurements  Waist: cm  Hip: cm  Scrotum: cm  Ground/Upper Thigh: 56.5 cm  Mid Thigh: 49.5 cm  Knee: 47 cm  Upper Calf: 48 cm  Mid Calf: 39 cm  Ankle: 29.5 cm  Heel to Foot: 25.5 cm  Total: 295 cm     Right Lower Extremity Circumferential Measurements  Waist: cm  Hip: cm  Scrotum: cm  Ground/Upper Thigh: 54.5 cm  Mid Thigh: 50 cm  Knee: 47 cm  Upper Calf: 54 cm  Mid Calf: 46.5 cm  Ankle: 32.5 cm  Heel to Foot: 27 cm  Total: 311.5 cm        Left Lower Extremity Circumferential Measurements 7/28  Ground/Upper Thigh: 55 cm  Mid Thigh: 51.5 cm  Knee: 45.5 cm  Upper Calf: 49.5 cm  Mid Calf: 40 cm  Ankle: 30.5 cm  Heel to Foot: 25 cm  Total: 297 cm     Right Lower Extremity Circumferential Measurements 7/28  Ground/Upper  Thigh: 56.5 cm  Mid Thigh: 50 cm  Knee: 47 cm  Upper Calf: 55.5 cm  Mid Calf: 47 cm  Ankle: 33.5 cm  Heel to Foot: 26 cm  Total: 315.5 cm        Left Lower Extremity Circumferential Measurements 7/26  Waist: cm  Hip: cm  Scrotum: cm  Ground/Upper Thigh: 56.5 cm  Mid Thigh: 49.5 cm  Knee: 48.5 cm  Upper Calf: 48 cm  Mid Calf: 45 cm  Ankle: 29 cm  Heel to Foot: 25.5 cm  Total: 302 cm     Right Lower Extremity Circumferential Measurements 7/26  Waist: cm  Hip: cm  Scrotum: cm  Ground/Upper Thigh: 55.5 cm  Mid Thigh: 52 cm  Knee: 47.5 cm  Upper Calf: 56.5 cm  Mid Calf: 47 cm  Ankle: 33 cm  Heel to Foot: 27.5 cm  Total: 319 cm           Left Lower Extremity Circumferential Measurements 7/23  Waist: cm  Hip: cm  Scrotum: cm  Ground/Upper Thigh: 57 cm  Mid Thigh: 51 cm  Knee: 49 cm  Upper Calf: 50.5 cm  Mid Calf: 42.5 cm  Ankle: 30.5 cm  Heel to Foot: 26.5 cm  Total: 307 cm     Right Lower Extremity Circumferential Measurements 7/23  Waist: cm  Hip: cm  Scrotum: cm  Ground/Upper Thigh: 56 cm  Mid Thigh: 51.5 cm  Knee: 48 cm  Upper Calf: 57 cm  Mid Calf: 48 cm  Ankle: 34 cm  Heel to Foot: 28.5 cm  Total: 323 cm           Left Lower Extremity Circumferential Measurements 7/21  Ground/Upper Thigh: 59 cm  Mid Thigh: 55 cm  Knee: 44 cm  Upper Calf: 49.5 cm  Mid Calf: 40 cm  Ankle: 31.5 cm  Heel to Foot: 26 cm  Total: 305 cm     Right Lower Extremity Circumferential Measurements 7/21  Ground/Upper Thigh: 56 cm  Mid Thigh: 52 cm  Knee: 48 cm  Upper Calf: 56.5 cm  Mid Calf: 46 cm  Ankle: 33.5 cm  Heel to Foot: 27.5 cm  Total: 319.5 cm             Left Lower Extremity Circumferential Measurements  Waist: cm  Hip: cm  Scrotum: cm  Ground/Upper Thigh: 61 cm  Mid Thigh: 56.5 cm  Knee: 49 cm  Upper Calf: 50 cm  Mid Calf: 40.5 cm  Ankle: 30.5 cm  Heel to Foot: 26 cm  Total: 313.5 cm     Right Lower Extremity Circumferential Measurements  Waist: cm  Hip: cm  Scrotum: cm  Ground/Upper Thigh: 57 cm  Mid Thigh: 55.5 cm  Knee: 48 cm  Upper  Calf: 58 cm  Mid Calf: 47 cm  Ankle: 33.5 cm  Heel to Foot: 26.5 cm  Total: 325.5 cm        Left Lower Extremity Circumferential Measurements 7/16  Waist: cm  Hip: cm  Scrotum: cm  Ground/Upper Thigh: 56 cm  Mid Thigh: 57 cm  Knee: 49.5 cm  Upper Calf: 52 cm  Mid Calf: 46 cm  Ankle: 30 cm  Heel to Foot: 26 cm  Total: 316.5 cm     Right Lower Extremity Circumferential Measurements 7/16  Waist: cm  Hip: cm  Scrotum: cm  Ground/Upper Thigh: 57.5 cm  Mid Thigh: 55 cm  Knee: 49 cm  Upper Calf: 57.5 cm  Mid Calf: 50 cm  Ankle: 32 cm  Heel to Foot: 26.5 cm  Total: 327.5 cm        Left Lower Extremity Circumferential Measurements 7/14  Ground/Upper Thigh: 58.5 cm  Mid Thigh: 59.5 cm  Knee: 47.5 cm  Upper Calf: 51 cm  Mid Calf: 46 cm  Ankle: 31 cm  Heel to Foot: 25.5 cm  Total: 319 cm     Right Lower Extremity Circumferential Measurements 7/14  Ground/Upper Thigh: 59 cm  Mid Thigh: 55 cm  Knee: 50 cm  Upper Calf: 60 cm  Mid Calf: 52.5 cm  Ankle: 33 cm  Heel to Foot: 27.5 cm  Total: 337 cm       Left Lower Extremity Circumferential Measurements7/12  Ground/Upper Thigh: 60.5 cm  Mid Thigh: 57 cm  Knee: 47 cm  Upper Calf: 53.5 cm  Mid Calf: 45 cm  Ankle: 32 cm  Heel to Foot: 25.5 cm  Total: 320.5 cm     Right Lower Extremity Circumferential Measurements 7/12  Ground/Upper Thigh: 58.5 cm  Mid Thigh: 56.5 cm  Knee: 50 cm  Upper Calf: 60.5 cm  Mid Calf: 47 cm  Ankle: 33 cm  Heel to Foot: 26.5 cm  Total: 332 cm     Left Lower Extremity Circumferential Measurements 7/9  Ground/Upper Thigh: 62.5 cm  Mid Thigh: 58 cm  Knee: 54 cm  Upper Calf: 57.5 cm  Mid Calf: 49.5 cm  Ankle: 33.5 cm  Heel to Foot: 27 cm  Total: 342 cm     Right Lower Extremity Circumferential Measurements 7/9  Ground/Upper Thigh: 60.5 cm  Mid Thigh: 60 cm  Knee: 53.5 cm  Upper Calf: 62 cm  Mid Calf: 57 cm  Ankle: 34.5 cm  Heel to Foot: 27.5 cm  Total: 355 cm    Therapeutic Treatments and Modalities:     1. Manual Therapy (CPT 45832), MLD to R LE without trunk  involvement. Fibrosis technique to proximal medial L LE. Edema technique to distal R LE.     2. Functional Training, Self Care (CPT 30752), Pt has been instructed on placement of Komprex as well as washing stockinette around Komprex. Discussed going swimming without wraps/bandages as well as exercises to perform. Discussed importance of ongoing exercise (going to gym, walking, etc) as well as skin care. Discussed compression pump including time, usage, cost. PT will make further inquiries as pt could greatly benefit from a home pump to prevent return of fluid.     Therapeutic Treatment and Modalities Summary: Multi-layer compression bandages applied to B LE. Pt declined application of kidneys or toe wraps. Komprex square placed over fibrotic portion of proximal medial L LE.     Time-based treatments/modalities:    Physical Therapy Timed Treatment Charges  Functional training, self care minutes (CPT 16187): 10 minutes  Manual therapy minutes (CPT 01182): 24 minutes      Assessment and Plan:   Assessment details:  Pt has achieved a total reduction of 45cm to R LE and 54.5cm to L LE. He is pleased with his progress and wraps are due to arrive in two days. At this point, pt would like to cease phase I of CDT and begin phase II. He will be seen for 1-2 additional visits should he have questions about wraps or compression pump.

## 2021-08-11 ENCOUNTER — PHYSICAL THERAPY (OUTPATIENT)
Dept: PHYSICAL THERAPY | Facility: REHABILITATION | Age: 76
End: 2021-08-11
Attending: ORTHOPAEDIC SURGERY
Payer: MEDICARE

## 2021-08-11 DIAGNOSIS — I89.0 LYMPHEDEMA OF BOTH LOWER EXTREMITIES: ICD-10-CM

## 2021-08-11 PROCEDURE — 97535 SELF CARE MNGMENT TRAINING: CPT

## 2021-08-11 NOTE — OP THERAPY DAILY TREATMENT
Outpatient Physical Therapy  LYMPHEDEMA THERAPY DAILY TREATMENT     [facility] Physical [address].  [address]  [place] [address]  Phone:  [phone]  Fax:  [phone]    Date: 08/11/2021    Patient: Jimmie Zamudio  YOB: 1945  MRN: [id number]     Time Calculation    Start time: 1116  Stop time: 1142 Time Calculation (min): 26 minutes         Chief Complaint: Lymphedema Aquired    Visit #: 13    Subjective:   History of Present Illness:     Mechanism of injury:  Pt received his wraps and had questions regarding fit and donning.       Lymphedema Objective      Other Notes  Measurements not taken today; pt has been wearing compression at home.           Therapeutic Treatments and Modalities:     1. Functional Training, Self Care (CPT 22613)    Therapeutic Treatment and Modalities Summary: Reviewed wrap donning sequence as well as appropriate compression amount by comparing markings on warp to card with pressure indicators. Pt with excellent demo of donning wrap with cues required for increasing compression to 20-30mmHg. He will likely have to cut proximal portion of thigh wrap to achieve more snug fit. Pt agreeable to this. Discussed importance of continuing MLD to IA bilaterally. Introduced activation of shoulder collectors.     Discussed recommendation for a Flexitouch for continued maintenance of decreased swelling after the initial course of decongestion from physical therapy so swelling does not return. Because the lymphatic system is located superficially and is already damaged from long-term swelling, a traditional pump will be inadequate. A traditional pump would cause further damage to the lymphatic limb as the compression will damage the already fragile and intact lymphatic vessels. Patient will be unable to tolerate the high pressures of a basic pump. A basic pump will exacerbate the condition and cause intolerable pain due to the superficial and sensitive  nature of lymphatic vessels. A Flexitouch works like manual lymphatic drainage, which clears the unaffected areas first to allow the swollen region to have tissue space to push the fluid and protein to. The Flexitouch also has a more progressive, segmental, and lighter compression than a traditional pump. Therefore, it is able to assist protein to move out of the affected tissue.    Pt has also expressed interest in garment purchase for day-wear. Will evaluate appropriate garment choices and present pt with options as he would like to assess options before moving forward.      Time-based treatments/modalities:    Physical Therapy Timed Treatment Charges  Functional training, self care minutes (CPT 73394): 26 minutes      Assessment and Plan:   Assessment details:  Pt has successfully transitioned to phase II of complete decongestive therapy. He is independent with his wraps and is completing a HEP independently as well. As stated above, he will benefit from a Flexitouch compression pump to prevent return of the significant quantity of fluid removed. Will evaluate garment choices and present them to patient prior to DC from PT services.     Plan:  Therapy options:  Physical therapy treatment to continue  Discussed with:  Patient and family

## 2021-11-16 ENCOUNTER — APPOINTMENT (OUTPATIENT)
Dept: PHYSICAL THERAPY | Facility: REHABILITATION | Age: 76
End: 2021-11-16
Attending: STUDENT IN AN ORGANIZED HEALTH CARE EDUCATION/TRAINING PROGRAM
Payer: MEDICARE

## 2021-11-16 PROBLEM — N18.9 CKD (CHRONIC KIDNEY DISEASE): Status: ACTIVE | Noted: 2021-11-16

## 2021-11-16 PROBLEM — R60.1 ANASARCA: Status: ACTIVE | Noted: 2021-11-16

## 2021-11-30 ENCOUNTER — PHYSICAL THERAPY (OUTPATIENT)
Dept: PHYSICAL THERAPY | Facility: REHABILITATION | Age: 76
End: 2021-11-30
Attending: STUDENT IN AN ORGANIZED HEALTH CARE EDUCATION/TRAINING PROGRAM
Payer: MEDICARE

## 2021-11-30 DIAGNOSIS — I89.0 LYMPHEDEMA OF BOTH LOWER EXTREMITIES: ICD-10-CM

## 2021-11-30 PROCEDURE — 97535 SELF CARE MNGMENT TRAINING: CPT

## 2021-11-30 PROCEDURE — 97161 PT EVAL LOW COMPLEX 20 MIN: CPT

## 2021-11-30 NOTE — OP THERAPY EVALUATION
Outpatient Physical Therapy  LYMPHEDEMA THERAPY INITIAL EVALUATION    [facility] Physical Therapy [address].  [address]  [place] [address]  Phone:  [phone]  Fax:  [phone]    Date of Evaluation: 11/30/2021    Patient: Jimmie Zamudio  YOB: 1945  MRN: [id number]     Referring Provider: Max Hernandez M.D.  [address] [address]   Referring Diagnosis Lymphedema of both lower extremities [I89.0]     Time Calculation    Start time: 1031  Stop time: 1115 Time Calculation (min): 44 minutes             Chief Complaint: Lymphedema Congenital    Visit Diagnoses     ICD-10-CM   1. Lymphedema of both lower extremities  I89.0       Subjective:   History of Present Illness:     Date of onset:  10/30/2021    Mechanism of injury:  Pt is known to this therapist from previous [facility]. Pt reports he has recently experienced an increase in scrotal swelling. He spent four days in the hospital for acute exacerbation of heart failure.where he received multiple doses of lasix and was able to reduce all swelling. Pt reports 9L of fluid was removed from his body. He was referred back to this therapist for re-evaluation of his B LE lymphedema.   Patient Goals:     Patient goals for therapy:  Decreased edema      Past Medical History:   Diagnosis Date   • Arthritis    • Atrial fibrillation (HCC)    • Blood transfusion without reported diagnosis    • Cataract    • CKD (chronic kidney disease) 11/16/2021   • Congestive heart failure with left ventricular systolic dysfunction (HCC)     EF 25%   • Hepatitis A    • History of blood clots    • Hyperlipidemia    • Hypertension    • OA (osteoarthritis) of neck      Past Surgical History:   Procedure Laterality Date   • GASTROSCOPY N/A 5/14/2019    Procedure: GASTROSCOPY;  Surgeon: Zi Fam M.D.;  Location: SURGERY [facility];  Service: General   • GASTROSCOPY N/A 2/22/2019    Procedure:  GASTROSCOPY;  Surgeon: Zi Fam M.D.;  Location: SURGERY [facility];  Service: General   • COLONOSCOPY N/A 2/22/2019    Procedure: COLONOSCOPY;  Surgeon: Zi Fam M.D.;  Location: SURGERY [facility];  Service: General   • CARPAL TUNNEL RELEASE     • KNEE REVISION TOTAL     • OTHER      Pain Pump   • OTHER      metal removed from back   • PB SPINAL FUSION,ANT,EA ADNL LEVEL      spinal fusion lower back   • TRIGGER FINGER RELEASE     • ZZZ IVC FILTER-CATH LAB      prior DVT     Social History     Tobacco Use   • Smoking status: Former Smoker   • Smokeless tobacco: Never Used   Substance Use Topics   • Alcohol use: No     Alcohol/week: 0.0 oz     Family and Occupational History     Socioeconomic History   • Marital status:      Spouse name: Not on file   • Number of children: Not on file   • Years of education: Not on file   • Highest education level: Not on file   Occupational History   • Not on file       Lymphedema Objective      Skin Appearance    Mild edema, hemosiderin staining.  Left Lower Extremity Circumferential Measurements  Waist: cm  Hip: cm  Scrotum: cm  Ground/Upper Thigh: 49.2 cm  Mid Thigh: 44 cm  Knee: 46.3 cm  Upper Calf: 42.8 cm  Mid Calf: 41.5 cm  Ankle: 32.2 cm  Heel to Foot: 24.7 cm  Total: 280.7 cm    Right Lower Extremity Circumferential Measurements  Waist: cm  Hip: cm  Scrotum: cm  Ground/Upper Thigh: 49.2 cm  Mid Thigh: 44.1 cm  Knee: 46.7 cm  Upper Calf: 47.1 cm  Mid Calf: 46.6 cm  Ankle: 34.3 cm  Heel to Foot: 26.1 cm  Total: 294.1 cm    Lymphedema Stage  Stage 2 Lymphedema          Therapeutic Treatments and Modalities:     1. Functional Training, Self Care (CPT 29391)    Therapeutic Treatment and Modalities Summary: Pt has been utilizing pump nightly and has also been wearing a soft liner at night as well. Has been wearing Circaids to B LE daily. Provided patient with resources for obtaining new liners as well as for when he requires new Circaids. Advised  patient that he can likely go down a size on his thigh-high Circaids next time he orders.     Time-based treatments/modalities:    Physical Therapy Timed Treatment Charges  Functional training, self care minutes (CPT 50794): 10 minutes      Assessment and Plan:   Assessment details:  Pt is a 77yo M previously seen by this therapist for [facility] for congenital bilateral LE lymphedema. He presents today with an improvement to his B LE total circumference as compared to the last visit in August of this year. He is consistently utilizing pump, although he reports he does not have an abdominal portion. As pt has reported scrotal swelling in the past, he may benefit from a Flexitouch pump to address this swelling as well as for ongoing maintenance of the reduction he has achieved. At this time, he does not require further intervention for his lymphedema and can continue in Phase II of CDT.    Goals:   Short Term Goals:  1. Pt will consistently wear compression to reduce limb circumference.   Patient progress towards short term goals:  Goal Met    Long Term Goals:  1. Pt will utilize pump nightly to reduce B LE circumference.   Patient progress towards Long Term goals:  1. Goal Met    Plan:  Therapy options:  No further treatment needed  Discussed with:  Patient      Functional Assessment Used    LLIS    Referring provider co-signature:  I have reviewed this plan of care and my co-signature certifies the need for services.    Certification Period: 11/30/2021 to  01/25/22    Physician Signature: ________________________________ Date: ______________

## 2021-12-06 ENCOUNTER — APPOINTMENT (OUTPATIENT)
Dept: PHYSICAL THERAPY | Facility: REHABILITATION | Age: 76
End: 2021-12-06
Attending: STUDENT IN AN ORGANIZED HEALTH CARE EDUCATION/TRAINING PROGRAM
Payer: MEDICARE

## 2021-12-09 ENCOUNTER — APPOINTMENT (OUTPATIENT)
Dept: PHYSICAL THERAPY | Facility: REHABILITATION | Age: 76
End: 2021-12-09
Attending: STUDENT IN AN ORGANIZED HEALTH CARE EDUCATION/TRAINING PROGRAM
Payer: MEDICARE

## 2021-12-20 ENCOUNTER — APPOINTMENT (OUTPATIENT)
Dept: PHYSICAL THERAPY | Facility: REHABILITATION | Age: 76
End: 2021-12-20
Attending: STUDENT IN AN ORGANIZED HEALTH CARE EDUCATION/TRAINING PROGRAM
Payer: MEDICARE

## 2021-12-29 ENCOUNTER — APPOINTMENT (OUTPATIENT)
Dept: PHYSICAL THERAPY | Facility: REHABILITATION | Age: 76
End: 2021-12-29
Attending: STUDENT IN AN ORGANIZED HEALTH CARE EDUCATION/TRAINING PROGRAM
Payer: MEDICARE

## 2022-01-04 ENCOUNTER — APPOINTMENT (OUTPATIENT)
Dept: PHYSICAL THERAPY | Facility: REHABILITATION | Age: 77
End: 2022-01-04
Attending: STUDENT IN AN ORGANIZED HEALTH CARE EDUCATION/TRAINING PROGRAM
Payer: MEDICARE

## 2022-01-05 ENCOUNTER — APPOINTMENT (OUTPATIENT)
Dept: PHYSICAL THERAPY | Facility: REHABILITATION | Age: 77
End: 2022-01-05
Attending: STUDENT IN AN ORGANIZED HEALTH CARE EDUCATION/TRAINING PROGRAM
Payer: MEDICARE

## 2022-04-06 PROBLEM — J96.01 ACUTE HYPOXEMIC RESPIRATORY FAILURE (HCC): Status: ACTIVE | Noted: 2022-04-06

## 2022-04-06 PROBLEM — I50.20 HFREF (HEART FAILURE WITH REDUCED EJECTION FRACTION) (HCC): Status: ACTIVE | Noted: 2022-04-06

## 2022-04-06 PROBLEM — N17.9 ACUTE RENAL FAILURE (ARF) (HCC): Status: ACTIVE | Noted: 2022-04-06

## 2022-04-07 PROBLEM — I50.23 ACUTE ON CHRONIC HFREF (HEART FAILURE WITH REDUCED EJECTION FRACTION) (HCC): Status: ACTIVE | Noted: 2022-04-07

## 2022-04-07 PROBLEM — I48.11 LONGSTANDING PERSISTENT ATRIAL FIBRILLATION (HCC): Status: ACTIVE | Noted: 2019-11-07

## 2022-04-07 PROBLEM — N18.9 ACUTE KIDNEY INJURY SUPERIMPOSED ON CHRONIC KIDNEY DISEASE (HCC): Status: ACTIVE | Noted: 2022-04-06

## 2022-04-07 PROBLEM — K21.9 GASTROESOPHAGEAL REFLUX DISEASE WITHOUT ESOPHAGITIS: Status: ACTIVE | Noted: 2022-04-07

## 2022-04-10 PROBLEM — N17.9 ACUTE KIDNEY INJURY SUPERIMPOSED ON CHRONIC KIDNEY DISEASE (HCC): Status: RESOLVED | Noted: 2022-04-06 | Resolved: 2022-04-10

## 2022-04-10 PROBLEM — N18.9 ACUTE KIDNEY INJURY SUPERIMPOSED ON CHRONIC KIDNEY DISEASE (HCC): Status: RESOLVED | Noted: 2022-04-06 | Resolved: 2022-04-10

## 2022-04-11 PROBLEM — I48.21 PERMANENT ATRIAL FIBRILLATION (HCC): Status: RESOLVED | Noted: 2019-02-03 | Resolved: 2022-04-11

## 2022-04-11 PROBLEM — Z95.828 PRESENCE OF IMPLANTED INFUSION PUMP: Status: ACTIVE | Noted: 2022-04-11

## 2022-04-11 PROBLEM — N17.9 ACUTE KIDNEY INJURY SUPERIMPOSED ON CHRONIC KIDNEY DISEASE (HCC): Status: ACTIVE | Noted: 2022-04-11

## 2022-04-11 PROBLEM — I48.21 PERMANENT ATRIAL FIBRILLATION (HCC): Status: ACTIVE | Noted: 2019-02-03

## 2022-04-11 PROBLEM — D63.8 ANEMIA OF CHRONIC DISEASE: Status: ACTIVE | Noted: 2022-04-11

## 2022-04-11 PROBLEM — I95.1 ORTHOSTATIC HYPOTENSION: Status: ACTIVE | Noted: 2022-04-11

## 2022-04-11 PROBLEM — G44.209 ACUTE NON INTRACTABLE TENSION-TYPE HEADACHE: Status: ACTIVE | Noted: 2022-04-11

## 2022-04-11 PROBLEM — N18.9 ACUTE KIDNEY INJURY SUPERIMPOSED ON CHRONIC KIDNEY DISEASE (HCC): Status: ACTIVE | Noted: 2022-04-11

## 2022-07-25 ENCOUNTER — PRE-ADMISSION TESTING (OUTPATIENT)
Dept: ADMISSIONS | Facility: MEDICAL CENTER | Age: 77
End: 2022-07-25
Attending: PAIN MEDICINE
Payer: MEDICARE

## 2022-07-25 RX ORDER — FUROSEMIDE 40 MG/1
30 TABLET ORAL 2 TIMES DAILY
Status: ON HOLD | COMMUNITY
Start: 2022-05-03 | End: 2023-03-19

## 2022-07-25 RX ORDER — POLYETHYLENE GLYCOL 3350 17 G
POWDER IN PACKET (EA) ORAL
COMMUNITY
Start: 2022-07-21

## 2022-07-25 RX ORDER — DOXYCYCLINE HYCLATE 100 MG/1
100 CAPSULE ORAL 2 TIMES DAILY
COMMUNITY
Start: 2022-05-08 | End: 2023-02-16 | Stop reason: SDUPTHER

## 2022-07-25 RX ORDER — HYDROCODONE BITARTRATE AND ACETAMINOPHEN 10; 325 MG/1; MG/1
2 TABLET ORAL EVERY 6 HOURS PRN
COMMUNITY
Start: 2022-07-12 | End: 2022-12-28

## 2022-07-25 NOTE — PREPROCEDURE INSTRUCTIONS
Pre-admit appointment completed.  Pt instructed to continue regularly prescribed medications through the day before surgery. Pt instructed to take the following medications the day of surgery with a sip of water, per anesthesia protocol; coreg, lanoxin, vibramycin, neurontin.    Pt holding aspirin per his doctor instruction.     MET's=>4. Pt uses a walker, but states he gets around everywhere with it and no SOB.     Pt states he did get his cardiology clearance that pain management requested for surgery. Spoke with Christina at Dr Field's office, and they will fax when received.     Dr Moore notified via email of procedure due to CHF (pt does have BLE edema and takes lasix), A-fib, HTN, renal disease,  Hx of DVT, and other co morbidities.

## 2022-07-25 NOTE — OR NURSING
Dr Moore reviewed patients medical history. Based upon information available, Dr Moore indicates:     Nothing further pending the cardiology clearance at this time. Thank you.  Marcell Moore M.D.  [facility]

## 2022-08-11 ENCOUNTER — APPOINTMENT (OUTPATIENT)
Dept: RADIOLOGY | Facility: MEDICAL CENTER | Age: 77
End: 2022-08-11
Attending: PAIN MEDICINE
Payer: MEDICARE

## 2022-08-11 ENCOUNTER — HOSPITAL ENCOUNTER (OUTPATIENT)
Facility: MEDICAL CENTER | Age: 77
End: 2022-08-11
Attending: PAIN MEDICINE | Admitting: PAIN MEDICINE
Payer: MEDICARE

## 2022-08-11 ENCOUNTER — ANESTHESIA EVENT (OUTPATIENT)
Dept: SURGERY | Facility: MEDICAL CENTER | Age: 77
End: 2022-08-11
Payer: MEDICARE

## 2022-08-11 ENCOUNTER — ANESTHESIA (OUTPATIENT)
Dept: SURGERY | Facility: MEDICAL CENTER | Age: 77
End: 2022-08-11
Payer: MEDICARE

## 2022-08-11 VITALS
DIASTOLIC BLOOD PRESSURE: 78 MMHG | TEMPERATURE: 97.2 F | RESPIRATION RATE: 16 BRPM | HEART RATE: 64 BPM | WEIGHT: 235.67 LBS | OXYGEN SATURATION: 91 % | SYSTOLIC BLOOD PRESSURE: 107 MMHG | BODY MASS INDEX: 28.69 KG/M2

## 2022-08-11 LAB
INR PPP: 1.35 (ref 0.87–1.13)
PROTHROMBIN TIME: 16.1 SEC (ref 12–14.6)

## 2022-08-11 PROCEDURE — 85610 PROTHROMBIN TIME: CPT

## 2022-08-11 PROCEDURE — C1772 INFUSION PUMP, PROGRAMMABLE: HCPCS | Performed by: PAIN MEDICINE

## 2022-08-11 PROCEDURE — 00400 ANES INTEGUMENTARY SYS NOS: CPT | Performed by: ANESTHESIOLOGY

## 2022-08-11 PROCEDURE — 700101 HCHG RX REV CODE 250: Performed by: ANESTHESIOLOGY

## 2022-08-11 PROCEDURE — 95938 SOMATOSENSORY TESTING: CPT | Performed by: PAIN MEDICINE

## 2022-08-11 PROCEDURE — 700105 HCHG RX REV CODE 258: Performed by: PAIN MEDICINE

## 2022-08-11 PROCEDURE — 700111 HCHG RX REV CODE 636 W/ 250 OVERRIDE (IP): Performed by: ANESTHESIOLOGY

## 2022-08-11 PROCEDURE — 700101 HCHG RX REV CODE 250: Performed by: PAIN MEDICINE

## 2022-08-11 PROCEDURE — 160046 HCHG PACU - 1ST 60 MINS PHASE II: Performed by: PAIN MEDICINE

## 2022-08-11 PROCEDURE — 95937 NEUROMUSCULAR JUNCTION TEST: CPT | Performed by: PAIN MEDICINE

## 2022-08-11 PROCEDURE — 95940 IONM IN OPERATNG ROOM 15 MIN: CPT | Performed by: PAIN MEDICINE

## 2022-08-11 PROCEDURE — 160002 HCHG RECOVERY MINUTES (STAT): Performed by: PAIN MEDICINE

## 2022-08-11 PROCEDURE — 160009 HCHG ANES TIME/MIN: Performed by: PAIN MEDICINE

## 2022-08-11 PROCEDURE — 700111 HCHG RX REV CODE 636 W/ 250 OVERRIDE (IP): Performed by: PAIN MEDICINE

## 2022-08-11 PROCEDURE — 160029 HCHG SURGERY MINUTES - 1ST 30 MINS LEVEL 4: Performed by: PAIN MEDICINE

## 2022-08-11 PROCEDURE — 36415 COLL VENOUS BLD VENIPUNCTURE: CPT

## 2022-08-11 PROCEDURE — 160035 HCHG PACU - 1ST 60 MINS PHASE I: Performed by: PAIN MEDICINE

## 2022-08-11 PROCEDURE — 95955 EEG DURING SURGERY: CPT | Performed by: PAIN MEDICINE

## 2022-08-11 PROCEDURE — 99100 ANES PT EXTEME AGE<1 YR&>70: CPT | Performed by: ANESTHESIOLOGY

## 2022-08-11 PROCEDURE — 160041 HCHG SURGERY MINUTES - EA ADDL 1 MIN LEVEL 4: Performed by: PAIN MEDICINE

## 2022-08-11 PROCEDURE — 95929 C MOTOR EVOKED LWR LIMBS: CPT | Performed by: PAIN MEDICINE

## 2022-08-11 PROCEDURE — 160048 HCHG OR STATISTICAL LEVEL 1-5: Performed by: PAIN MEDICINE

## 2022-08-11 PROCEDURE — 95861 NEEDLE EMG 2 EXTREMITIES: CPT | Performed by: PAIN MEDICINE

## 2022-08-11 PROCEDURE — 160025 RECOVERY II MINUTES (STATS): Performed by: PAIN MEDICINE

## 2022-08-11 DEVICE — PUMP SYNCHRO MEDII 20ML: Type: IMPLANTABLE DEVICE | Site: ABDOMEN | Status: FUNCTIONAL

## 2022-08-11 RX ORDER — DEXAMETHASONE SODIUM PHOSPHATE 4 MG/ML
INJECTION, SOLUTION INTRA-ARTICULAR; INTRALESIONAL; INTRAMUSCULAR; INTRAVENOUS; SOFT TISSUE PRN
Status: DISCONTINUED | OUTPATIENT
Start: 2022-08-11 | End: 2022-08-11 | Stop reason: SURG

## 2022-08-11 RX ORDER — CEFAZOLIN SODIUM 1 G/3ML
INJECTION, POWDER, FOR SOLUTION INTRAMUSCULAR; INTRAVENOUS PRN
Status: DISCONTINUED | OUTPATIENT
Start: 2022-08-11 | End: 2022-08-11 | Stop reason: SURG

## 2022-08-11 RX ORDER — HYDROMORPHONE HYDROCHLORIDE 1 MG/ML
0.4 INJECTION, SOLUTION INTRAMUSCULAR; INTRAVENOUS; SUBCUTANEOUS
Status: DISCONTINUED | OUTPATIENT
Start: 2022-08-11 | End: 2022-08-11 | Stop reason: HOSPADM

## 2022-08-11 RX ORDER — MORPHINE SULFATE 0.5 MG/ML
INJECTION, SOLUTION EPIDURAL; INTRATHECAL; INTRAVENOUS PRN
Status: DISCONTINUED | OUTPATIENT
Start: 2022-08-11 | End: 2022-08-11 | Stop reason: SURG

## 2022-08-11 RX ORDER — LIDOCAINE HYDROCHLORIDE 20 MG/ML
INJECTION, SOLUTION EPIDURAL; INFILTRATION; INTRACAUDAL; PERINEURAL PRN
Status: DISCONTINUED | OUTPATIENT
Start: 2022-08-11 | End: 2022-08-11 | Stop reason: SURG

## 2022-08-11 RX ORDER — METOPROLOL TARTRATE 1 MG/ML
1 INJECTION, SOLUTION INTRAVENOUS
Status: DISCONTINUED | OUTPATIENT
Start: 2022-08-11 | End: 2022-08-11 | Stop reason: HOSPADM

## 2022-08-11 RX ORDER — HYDRALAZINE HYDROCHLORIDE 20 MG/ML
5 INJECTION INTRAMUSCULAR; INTRAVENOUS
Status: DISCONTINUED | OUTPATIENT
Start: 2022-08-11 | End: 2022-08-11 | Stop reason: HOSPADM

## 2022-08-11 RX ORDER — OXYCODONE HCL 5 MG/5 ML
10 SOLUTION, ORAL ORAL
Status: DISCONTINUED | OUTPATIENT
Start: 2022-08-11 | End: 2022-08-11 | Stop reason: HOSPADM

## 2022-08-11 RX ORDER — DIPHENHYDRAMINE HYDROCHLORIDE 50 MG/ML
12.5 INJECTION INTRAMUSCULAR; INTRAVENOUS
Status: DISCONTINUED | OUTPATIENT
Start: 2022-08-11 | End: 2022-08-11 | Stop reason: HOSPADM

## 2022-08-11 RX ORDER — HALOPERIDOL 5 MG/ML
1 INJECTION INTRAMUSCULAR
Status: DISCONTINUED | OUTPATIENT
Start: 2022-08-11 | End: 2022-08-11 | Stop reason: HOSPADM

## 2022-08-11 RX ORDER — SODIUM CHLORIDE, SODIUM LACTATE, POTASSIUM CHLORIDE, CALCIUM CHLORIDE 600; 310; 30; 20 MG/100ML; MG/100ML; MG/100ML; MG/100ML
INJECTION, SOLUTION INTRAVENOUS CONTINUOUS
Status: DISCONTINUED | OUTPATIENT
Start: 2022-08-11 | End: 2022-08-11 | Stop reason: HOSPADM

## 2022-08-11 RX ORDER — SUCCINYLCHOLINE CHLORIDE 20 MG/ML
INJECTION INTRAMUSCULAR; INTRAVENOUS PRN
Status: DISCONTINUED | OUTPATIENT
Start: 2022-08-11 | End: 2022-08-11 | Stop reason: SURG

## 2022-08-11 RX ORDER — HYDROMORPHONE HYDROCHLORIDE 1 MG/ML
0.2 INJECTION, SOLUTION INTRAMUSCULAR; INTRAVENOUS; SUBCUTANEOUS
Status: DISCONTINUED | OUTPATIENT
Start: 2022-08-11 | End: 2022-08-11 | Stop reason: HOSPADM

## 2022-08-11 RX ORDER — ROCURONIUM BROMIDE 10 MG/ML
INJECTION, SOLUTION INTRAVENOUS PRN
Status: DISCONTINUED | OUTPATIENT
Start: 2022-08-11 | End: 2022-08-11 | Stop reason: SURG

## 2022-08-11 RX ORDER — HYDROMORPHONE HYDROCHLORIDE 1 MG/ML
0.1 INJECTION, SOLUTION INTRAMUSCULAR; INTRAVENOUS; SUBCUTANEOUS
Status: DISCONTINUED | OUTPATIENT
Start: 2022-08-11 | End: 2022-08-11 | Stop reason: HOSPADM

## 2022-08-11 RX ORDER — OXYCODONE HCL 5 MG/5 ML
5 SOLUTION, ORAL ORAL
Status: DISCONTINUED | OUTPATIENT
Start: 2022-08-11 | End: 2022-08-11 | Stop reason: HOSPADM

## 2022-08-11 RX ORDER — ONDANSETRON 2 MG/ML
4 INJECTION INTRAMUSCULAR; INTRAVENOUS ONCE
Status: DISCONTINUED | OUTPATIENT
Start: 2022-08-11 | End: 2022-08-11 | Stop reason: HOSPADM

## 2022-08-11 RX ORDER — BUPIVACAINE HYDROCHLORIDE 2.5 MG/ML
INJECTION, SOLUTION EPIDURAL; INFILTRATION; INTRACAUDAL
Status: DISCONTINUED | OUTPATIENT
Start: 2022-08-11 | End: 2022-08-11 | Stop reason: HOSPADM

## 2022-08-11 RX ORDER — ONDANSETRON 2 MG/ML
INJECTION INTRAMUSCULAR; INTRAVENOUS PRN
Status: DISCONTINUED | OUTPATIENT
Start: 2022-08-11 | End: 2022-08-11 | Stop reason: SURG

## 2022-08-11 RX ORDER — IPRATROPIUM BROMIDE AND ALBUTEROL SULFATE 2.5; .5 MG/3ML; MG/3ML
3 SOLUTION RESPIRATORY (INHALATION)
Status: DISCONTINUED | OUTPATIENT
Start: 2022-08-11 | End: 2022-08-11 | Stop reason: HOSPADM

## 2022-08-11 RX ORDER — VANCOMYCIN HYDROCHLORIDE 1 G/20ML
INJECTION, POWDER, LYOPHILIZED, FOR SOLUTION INTRAVENOUS
Status: COMPLETED | OUTPATIENT
Start: 2022-08-11 | End: 2022-08-11

## 2022-08-11 RX ADMIN — DEXAMETHASONE SODIUM PHOSPHATE 4 MG: 4 INJECTION, SOLUTION INTRA-ARTICULAR; INTRALESIONAL; INTRAMUSCULAR; INTRAVENOUS; SOFT TISSUE at 11:13

## 2022-08-11 RX ADMIN — MORPHINE SULFATE 5 MG: 0.5 INJECTION EPIDURAL; INTRATHECAL; INTRAVENOUS at 11:13

## 2022-08-11 RX ADMIN — SUCCINYLCHOLINE CHLORIDE 100 MG: 20 INJECTION, SOLUTION INTRAMUSCULAR; INTRAVENOUS; PARENTERAL at 10:46

## 2022-08-11 RX ADMIN — SODIUM CHLORIDE, POTASSIUM CHLORIDE, SODIUM LACTATE AND CALCIUM CHLORIDE: 600; 310; 30; 20 INJECTION, SOLUTION INTRAVENOUS at 09:18

## 2022-08-11 RX ADMIN — CEFAZOLIN 2 G: 330 INJECTION, POWDER, FOR SOLUTION INTRAMUSCULAR; INTRAVENOUS at 11:01

## 2022-08-11 RX ADMIN — ROCURONIUM BROMIDE 10 MG: 10 INJECTION, SOLUTION INTRAVENOUS at 10:46

## 2022-08-11 RX ADMIN — EPHEDRINE SULFATE 10 MG: 50 INJECTION, SOLUTION INTRAVENOUS at 10:47

## 2022-08-11 RX ADMIN — LIDOCAINE HYDROCHLORIDE 100 MG: 20 INJECTION, SOLUTION EPIDURAL; INFILTRATION; INTRACAUDAL at 10:46

## 2022-08-11 RX ADMIN — PROPOFOL 150 MG: 10 INJECTION, EMULSION INTRAVENOUS at 10:46

## 2022-08-11 RX ADMIN — ONDANSETRON 4 MG: 2 INJECTION INTRAMUSCULAR; INTRAVENOUS at 11:13

## 2022-08-11 RX ADMIN — FENTANYL CITRATE 100 MCG: 50 INJECTION, SOLUTION INTRAMUSCULAR; INTRAVENOUS at 10:41

## 2022-08-11 ASSESSMENT — PAIN DESCRIPTION - PAIN TYPE: TYPE: SURGICAL PAIN

## 2022-08-11 ASSESSMENT — FIBROSIS 4 INDEX: FIB4 SCORE: 2.24

## 2022-08-11 ASSESSMENT — PAIN SCALES - GENERAL: PAIN_LEVEL: 0

## 2022-08-11 NOTE — ANESTHESIA PREPROCEDURE EVALUATION
Case: [id number] Date/Time: 08/11/22 0945    Procedure: PAIN PUMP REPLACEMENT    Pre-op diagnosis: POST LAMINECTOMY SYNDROME    Location:  OR 02 / SURGERY [facility]    Surgeons: Maurizio Field M.D.          Relevant Problems   PULMONARY   (positive) Pneumonia due to infectious organism      NEURO   (positive) Acute non intractable tension-type headache      CARDIAC   (positive) Essential hypertension   (positive) Longstanding persistent atrial fibrillation (HCC)   (positive) Systolic CHF (HCC)      GI   (positive) Gastroesophageal reflux disease without esophagitis         (positive) Acute kidney injury superimposed on chronic kidney disease (HCC)   (positive) CKD (chronic kidney disease)       Physical Exam    Airway   Mallampati: III  TM distance: >3 FB  Neck ROM: full       Cardiovascular - normal exam  Rhythm: regular  Rate: normal  (-) murmur     Dental - normal exam           Pulmonary - normal exam  Breath sounds clear to auscultation     Abdominal    Neurological - normal exam                 Anesthesia Plan    ASA 3   ASA physical status 3 criteria: moderate reduction of ejection fraction    Plan - general       Airway plan will be ETT          Induction: intravenous    Postoperative Plan: Postoperative administration of opioids is intended.    Pertinent diagnostic labs and testing reviewed    Informed Consent:    Anesthetic plan and risks discussed with patient.    Use of blood products discussed with: patient whom consented to blood products.

## 2022-08-11 NOTE — OR NURSING
PAIN PUMP REPLACEMENT - Bilateral  Maurizio Field M.D.  Breezy Buitrago M.D.  -------------------------------------------------------  1146:   To PACU from OR via gurney,  respirations spontaneous and non-labored. Icepack applied over c/d/i right lateral side of abd surgical dressings. Abdominal binder in place. BLE with 3+ edema non-pitting, purple-reddish in color. Lateral side of right calf with pinpoint spot from procedure done today, no bleeding out. Body temp < 36.0, warm blankets applied as well as Aman Paws.     1200: OPA Dc'd.  Pt opens eyes to verbal command.  Pt remains calm, denies pain or nausea.  Drowsy.    1215: Pt tolerating sips of water.  Answers questions appropriately.  Denies pain or nausea. Pt stated wife has glasses.   [employer] rep at bedside turning on device.   Patient's wife called, left message on phone.   No change in surgical site assessment.     1230: No change in surgical site assessments.  Resspirations spontaneous and non-labored. Meets criteria to transfer to Stage 2.     1231:  Pt safely transferred to stage 2.

## 2022-08-11 NOTE — OR NURSING
1235: Report from Belinda GREENWOOD. Patient in bathroom now with CNA SBA + walker - steady on his feet with assist.     1249: Family arrived to patient station.     1253: RLQ site CDI, no bruising noted. Patient education completed, family denies further questions. Patient education completed, family denies further questions. DC statues pending Dr Emir otero prior to go home.     1255: Rep at patient station for patient education.     1310: DC'd to care of family post uneventful stay in PACU 2.

## 2022-08-11 NOTE — ANESTHESIA PROCEDURE NOTES
Airway    Date/Time: 8/11/2022 10:48 AM  Performed by: Breezy Buitrago M.D.  Authorized by: Breezy Buitrago M.D.     Location:  OR  Urgency:  Elective  Difficult Airway: No    Indications for Airway Management:  Anesthesia      Spontaneous Ventilation: absent    Sedation Level:  Deep  Preoxygenated: Yes    Patient Position:  Sniffing  Mask Difficulty Assessment:  1 - vent by mask  Final Airway Type:  Endotracheal airway  Final Endotracheal Airway:  ETT  Cuffed: Yes    Technique Used for Successful ETT Placement:  Direct laryngoscopy    Insertion Site:  Oral  Blade Type:  Pardo  Laryngoscope Blade/Videolaryngoscope Blade Size:  2  ETT Size (mm):  7.5  Measured from:  Teeth  ETT to Teeth (cm):  22  Placement Verified by: auscultation and capnometry    Cormack-Lehane Classification:  Grade I - full view of glottis  Number of Attempts at Approach:  1

## 2022-08-11 NOTE — ANESTHESIA TIME REPORT
Anesthesia Start and Stop Event Times     Date Time Event    8/11/2022 1010 Ready for Procedure     1038 Anesthesia Start     1152 Anesthesia Stop        Responsible Staff  08/11/22    Name Role Begin End    Breezy Buitrago M.D. Anesth 1038 1152        Overtime Reason:  no overtime (within assigned shift)    Comments:

## 2022-08-11 NOTE — DISCHARGE INSTR - OTHER INFO
1. Patient transferred to PACU  2. Discharge from PACU when discharge criteria are met.  3. Follow up in 10-14 days in the office.

## 2022-08-11 NOTE — OR NURSING
Patient allergies and NPO status verified, home medication reconciliation completed and belongings secured. Pt verbalizes understanding of pain scale, expected course of stay, and plan of care. Surgical site verified w/ pt. IV access established. Sequentials placed on legs. Triple aim completed by CNA.

## 2022-08-11 NOTE — ANESTHESIA POSTPROCEDURE EVALUATION
Patient: Jimmie Zamudio    Procedure Summary     Date: 08/11/22 Room / Location:  OR 02 / SURGERY [facility]    Anesthesia Start: 1038 Anesthesia Stop: 1152    Procedure: PAIN PUMP REPLACEMENT (Bilateral: Abdomen) Diagnosis: (POST LAMINECTOMY SYNDROME)    Surgeons: Maurizio Field M.D. Responsible Provider: Breezy Buitrago M.D.    Anesthesia Type: general ASA Status: 3          Final Anesthesia Type: general  Last vitals  BP   Blood Pressure : 105/77    Temp   36.2 °C (97.2 °F)    Pulse   (!) 57   Resp   18    SpO2   93 %      Anesthesia Post Evaluation    Patient location during evaluation: PACU  Patient participation: complete - patient participated  Level of consciousness: awake and alert  Pain score: 0    Airway patency: patent  Anesthetic complications: no  Cardiovascular status: hemodynamically stable  Respiratory status: acceptable  Hydration status: euvolemic    PONV: none          There were no known notable events for this encounter.     Nurse Pain Score: 0 (NPRS)

## 2022-08-11 NOTE — OP REPORT
Procedure  Pain Pump Replacement   Performed By  Maurizio Field MD   Indication  End of life battery   Comments  Surgeon: Maurizio Field MD  Assistants: None    Chronic pain syndrome , End of life   Procedure    1- Replacement of pain pump Synchromed 2 [employer]   2- Intraop Neuromonitoring         Anesthesia: General Anesthesia with LMA      since she demonstrated favorable response to the intrathecal opioid with adequately acceptable pain relief and less degree of side effects . Informed consent obtained     Description of Procedure  Preparation : After proper identification, the patient was brought to the Operating Room and placed in the prone position. Care was taken during positioning to protect and pad all pressure points. Antibiotic was administered as preoperative antibiotic prophylaxis. After administration of general anesthesia, the back was prepped and draped in the usual sterile fashion using chlorhexidine and sterile drape. The fluoroscope unit was sterilely draped and brought into the field.       Pain Pump Replacement    The skin overlying the old incision on right lower abdomen  was marked and anesthetized with lidocaine 1% with epinephrine. Then by using blade size 10, the incision was made in midline with the length of 5 inches. Subcutaneous tissue was dissected and homeostasis was achieved with bovie, The pain pump was exposed.  The suture around the pump was removed. The area was irrigated with copious saline and bacitracin. The old pump was removed and replaced with new pain pump. The existing catheter was connected to the pump.    The suture ethibond 2.0 was used to secure the pain pump.    Iqbal needle was placed into access port and free flow of CSF was observed. The patient was responding well to the pump. Thus, I decided no to replace the catheter.  The pain pump programmed.     Closure    Incision was  closed with interrupted 2.0 vicryl in the subcutaneous layer, staples were placed dermabond,  sterile occlusive dressings were applied. All sponge, needle and instrument counts were correct. The patient tolerated the procedure well. One gram of vancomycin was used inside the pocket. Bacitracin ointment was applied over the staples    Blood loss: Minimal  Complication: None      Disposition:  1. Patient transferred to PACU  2. Discharge from PACU when discharge criteria are met.  3. Follow up in 10-14 days in the office.

## 2022-08-11 NOTE — DISCHARGE INSTRUCTIONS
If any questions arise, call your provider, Dr. Field @ [phone].  If your provider is not available, please feel free to call the [facility] at [phone].  Follow up in 10-14 days in the office.    MEDICATIONS: Resume taking daily medication.  Take prescribed pain medication with food.  If no medication is prescribed, you may take non-aspirin pain medication if needed.  PAIN MEDICATION CAN BE VERY CONSTIPATING.  Take a stool softener or laxative such as senokot, pericolace, or milk of magnesia if needed.    What to Expect Post Anesthesia    Rest and take it easy for the first 24 hours.  A responsible adult is recommended to remain with you during that time.  It is normal to feel sleepy.  We encourage you to not do anything that requires balance, judgment or coordination.    FOR 24 HOURS DO NOT:  Drive, operate machinery or run household appliances.  Drink beer or alcoholic beverages.  Make important decisions or sign legal documents.    To avoid nausea, slowly advance diet as tolerated, avoiding spicy or greasy foods for the first day.  Add more substantial food to your diet according to your provider's instructions.   INCREASE FLUIDS AND FIBER TO AVOID CONSTIPATION.    MILD FLU-LIKE SYMPTOMS ARE NORMAL.  YOU MAY EXPERIENCE GENERALIZED MUSCLE ACHES, THROAT IRRITATION, HEADACHE AND/OR SOME NAUSEA.

## 2022-12-28 PROBLEM — E87.5 HYPERKALEMIA: Status: ACTIVE | Noted: 2022-12-28

## 2022-12-31 PROBLEM — N18.9 ACUTE KIDNEY INJURY SUPERIMPOSED ON CHRONIC KIDNEY DISEASE (HCC): Status: RESOLVED | Noted: 2022-04-11 | Resolved: 2022-12-31

## 2022-12-31 PROBLEM — J96.01 ACUTE HYPOXEMIC RESPIRATORY FAILURE (HCC): Status: RESOLVED | Noted: 2022-04-06 | Resolved: 2022-12-31

## 2022-12-31 PROBLEM — N17.9 ACUTE KIDNEY INJURY SUPERIMPOSED ON CHRONIC KIDNEY DISEASE (HCC): Status: RESOLVED | Noted: 2022-04-11 | Resolved: 2022-12-31

## 2022-12-31 PROBLEM — E87.5 HYPERKALEMIA: Status: RESOLVED | Noted: 2022-12-28 | Resolved: 2022-12-31

## 2022-12-31 PROBLEM — I50.23 ACUTE ON CHRONIC HFREF (HEART FAILURE WITH REDUCED EJECTION FRACTION) (HCC): Status: RESOLVED | Noted: 2022-04-07 | Resolved: 2022-12-31

## 2023-03-17 PROBLEM — K76.1 HEPATIC CONGESTION: Status: ACTIVE | Noted: 2023-03-17

## 2023-03-17 PROBLEM — E87.1 HYPONATREMIA: Status: RESOLVED | Noted: 2019-11-07 | Resolved: 2023-03-17

## 2023-03-17 PROBLEM — J18.9 PNEUMONIA DUE TO INFECTIOUS ORGANISM: Status: RESOLVED | Noted: 2019-02-03 | Resolved: 2023-03-17

## 2023-03-17 PROBLEM — J10.1 INFLUENZA A: Status: RESOLVED | Noted: 2019-02-03 | Resolved: 2023-03-17

## 2023-03-17 PROBLEM — N18.9 ACUTE KIDNEY INJURY SUPERIMPOSED ON CKD (HCC): Chronic | Status: ACTIVE | Noted: 2021-11-16

## 2023-03-17 PROBLEM — N17.9 ACUTE KIDNEY INJURY SUPERIMPOSED ON CKD (HCC): Chronic | Status: ACTIVE | Noted: 2021-11-16

## 2023-03-17 PROBLEM — R79.9 ELEVATED BUN: Status: RESOLVED | Noted: 2019-11-07 | Resolved: 2023-03-17

## 2023-03-17 PROBLEM — I50.41 ACUTE COMBINED SYSTOLIC AND DIASTOLIC CHF, NYHA CLASS 3 (HCC): Status: ACTIVE | Noted: 2023-03-17

## 2023-03-17 PROBLEM — Z79.2 CHRONIC ANTIBIOTIC SUPPRESSION: Status: ACTIVE | Noted: 2023-03-17

## (undated) DEVICE — PACK MINOR BASIN - (2EA/CA)

## (undated) DEVICE — NEEDLE NON SAFETY HYPO 22 GA X 1 1/2 IN (100/BX)

## (undated) DEVICE — SYRINGE LOSS OF RESIST. 50/BX - (50/CA)

## (undated) DEVICE — ELECTRODE DUAL RETURN W/ CORD - (50/PK)

## (undated) DEVICE — TOWEL STOP TIMEOUT SAFETY FLAG (40EA/CA)

## (undated) DEVICE — STYLETTE 6FR INFANT STERILE SINGEL ALUMINUM SUNSLIP SEALED IN PVC SHEATH (20EA/BX)

## (undated) DEVICE — SENSOR OXIMETER ADULT SPO2 RD SET (20EA/BX)

## (undated) DEVICE — SUTURE 0 ETHIBOND CT-1 - (12/BX) 18 INCH

## (undated) DEVICE — SODIUM CHL IRRIGATION 0.9% 1000ML (12EA/CA)

## (undated) DEVICE — INTRAOP NEURO IN OR 1:1 PER 15 MIN

## (undated) DEVICE — SET EXTENSION WITH 2 PORTS (48EA/CA) ***PART #2C8610 IS A SUBSTITUTE*****

## (undated) DEVICE — GLOVE BIOGEL PI INDICATOR SZ 7.5 SURGICAL PF LF -(50/BX 4BX/CA)

## (undated) DEVICE — CONTAINER, SPECIMEN, STERILE

## (undated) DEVICE — TUBING CLEARLINK DUO-VENT - C-FLO (48EA/CA)

## (undated) DEVICE — DRAPE LAPAROTOMY T SHEET - (12EA/CA)

## (undated) DEVICE — SYRINGE 10 ML CONTROL LL (25EA/BX 4BX/CA)

## (undated) DEVICE — HUMID-VENT HEAT AND MOISTURE EXCHANGE- (50/BX)

## (undated) DEVICE — DRESSING TRANSPARENT FILM TEGADERM 4 X 4.75" (50EA/BX)"

## (undated) DEVICE — DRAPE C-ARM LARGE 41IN X 74 IN - (10/BX 2BX/CA)

## (undated) DEVICE — STAPLER 35MM SKIN WIDE (6EA/BX)

## (undated) DEVICE — BLADE SURGICAL #10 - (50/BX)

## (undated) DEVICE — DRAPE IOBAN II INCISE 23X17 - (10EA/BX 4BX/CA)

## (undated) DEVICE — GLOVE SZ 7.5 LF PROTEXIS (50PR/BX)

## (undated) DEVICE — GUIDE TRACHE TUBE INTUBATING STYLET 5.0-10.0MM 14FR (20EA/PK)

## (undated) DEVICE — DRESSING NON ADHERENT 3 X 4 - STERILE (100/BX 12BX/CA)

## (undated) DEVICE — BLADE SURGICAL #15 - (50/BX 3BX/CA)

## (undated) DEVICE — NEEDLE SPINAL NON-SAFETY 25GA X 3 (25EA/BX)"

## (undated) DEVICE — SUCTION INSTRUMENT YANKAUER BULBOUS TIP W/O VENT (50EA/CA)

## (undated) DEVICE — BINDER ABDOMINAL FITS WAIST 36 IN-65IN MED/LRG (1/EA)

## (undated) DEVICE — CONTROLLER

## (undated) DEVICE — BINDER ABDOMINAL 45-62 INCH - 4 PANEL 12 IN (1/EA)

## (undated) DEVICE — Device

## (undated) DEVICE — SUTURE 2-0 VICRYL PLUS CT-1 - 8 X 18 INCH(12/BX)

## (undated) DEVICE — STOCKING KNEE HIGH MED. REG (12PR/BX)

## (undated) DEVICE — PACK BREAST SM OR - (1EA/CA)

## (undated) DEVICE — DRESSING NON-ADHERING 8 X 3 - (50/BX)

## (undated) DEVICE — CANISTER SUCTION RIGID RED 1500CC (40EA/CA)

## (undated) DEVICE — DRAPE STRLE REG TOWEL 18X24 - (10/BX 4BX/CA)"

## (undated) DEVICE — CANNULA W/ SUPPLY TUBING O2 - (50/CA)

## (undated) DEVICE — TUBE CONNECT SUCTION CLEAR 120 X 1/4" (50EA/CA)"